# Patient Record
Sex: FEMALE | Race: BLACK OR AFRICAN AMERICAN | NOT HISPANIC OR LATINO | URBAN - METROPOLITAN AREA
[De-identification: names, ages, dates, MRNs, and addresses within clinical notes are randomized per-mention and may not be internally consistent; named-entity substitution may affect disease eponyms.]

---
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
## 2023-03-31 NOTE — ED PEDIATRIC NURSE NOTE - NS CRAFFT PART A1 ALCOHOL
Medical Record reviewed.    Praveen Alva was discharged from [facility] on 3/16/23.    30 day end date: 4/15/23    Spoke to Praveen Alva in follow-up to hospital stay.    Since discharge patient has been feeling good.     Ambulation/Activity:without difficulty     Appetite: Normal     Pain: no pain     Appointments: Reviewed her appointments     Patient Education: None     Patient did not have further questions or concerns.    Pt states still has the O2 but is not using at this time.  She stated she checks her Sats and they are consistently in the 90s    Next patient outreach encounter scheduled.  Please call if you have any questions.    Lyle SCHAFERN, RN  Transitional Care RN  [phone]  
No

## 2023-06-30 ENCOUNTER — EMERGENCY (EMERGENCY)
Facility: HOSPITAL | Age: 18
LOS: 0 days | Discharge: HOME | End: 2023-06-30
Attending: EMERGENCY MEDICINE
Payer: COMMERCIAL

## 2023-06-30 VITALS
DIASTOLIC BLOOD PRESSURE: 66 MMHG | HEART RATE: 81 BPM | TEMPERATURE: 98 F | SYSTOLIC BLOOD PRESSURE: 113 MMHG | RESPIRATION RATE: 17 BRPM | OXYGEN SATURATION: 100 %

## 2023-06-30 VITALS
HEIGHT: 63 IN | DIASTOLIC BLOOD PRESSURE: 78 MMHG | TEMPERATURE: 98 F | HEART RATE: 100 BPM | RESPIRATION RATE: 18 BRPM | WEIGHT: 229.94 LBS | OXYGEN SATURATION: 100 % | SYSTOLIC BLOOD PRESSURE: 118 MMHG

## 2023-06-30 DIAGNOSIS — S22.039A UNSPECIFIED FRACTURE OF THIRD THORACIC VERTEBRA, INITIAL ENCOUNTER FOR CLOSED FRACTURE: ICD-10-CM
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
## 2023-06-30 DIAGNOSIS — M54.89 OTHER DORSALGIA: ICD-10-CM

## 2023-06-30 DIAGNOSIS — R07.89 OTHER CHEST PAIN: ICD-10-CM
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
## 2023-06-30 DIAGNOSIS — S22.049A UNSPECIFIED FRACTURE OF FOURTH THORACIC VERTEBRA, INITIAL ENCOUNTER FOR CLOSED FRACTURE: ICD-10-CM
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
## 2023-06-30 LAB
ABO RH CONFIRMATION: SIGNIFICANT CHANGE UP
ANION GAP SERPL CALC-SCNC: 2 MMOL/L — LOW (ref 5–17)
BASOPHILS # BLD AUTO: 0.02 K/UL — SIGNIFICANT CHANGE UP (ref 0–0.2)
BASOPHILS NFR BLD AUTO: 0.2 % — SIGNIFICANT CHANGE UP (ref 0–2)
BLD GP AB SCN SERPL QL: SIGNIFICANT CHANGE UP
BUN SERPL-MCNC: 10 MG/DL — SIGNIFICANT CHANGE UP (ref 7–23)
CALCIUM SERPL-MCNC: 8.8 MG/DL — SIGNIFICANT CHANGE UP (ref 8.5–10.1)
CHLORIDE SERPL-SCNC: 107 MMOL/L — SIGNIFICANT CHANGE UP (ref 96–108)
CO2 SERPL-SCNC: 28 MMOL/L — SIGNIFICANT CHANGE UP (ref 22–31)
CREAT SERPL-MCNC: 0.76 MG/DL — SIGNIFICANT CHANGE UP (ref 0.5–1.3)
EGFR: 115 ML/MIN/1.73M2 — SIGNIFICANT CHANGE UP
EOSINOPHIL # BLD AUTO: 0.1 K/UL — SIGNIFICANT CHANGE UP (ref 0–0.5)
EOSINOPHIL NFR BLD AUTO: 1 % — SIGNIFICANT CHANGE UP (ref 0–6)
GLUCOSE SERPL-MCNC: 119 MG/DL — HIGH (ref 70–99)
HCT VFR BLD CALC: 36 % — SIGNIFICANT CHANGE UP (ref 34.5–45)
HGB BLD-MCNC: 11.7 G/DL — SIGNIFICANT CHANGE UP (ref 11.5–15.5)
IMM GRANULOCYTES NFR BLD AUTO: 0.7 % — SIGNIFICANT CHANGE UP (ref 0–0.9)
LYMPHOCYTES # BLD AUTO: 1.52 K/UL — SIGNIFICANT CHANGE UP (ref 1–3.3)
LYMPHOCYTES # BLD AUTO: 15.4 % — SIGNIFICANT CHANGE UP (ref 13–44)
MCHC RBC-ENTMCNC: 26.6 PG — LOW (ref 27–34)
MCHC RBC-ENTMCNC: 32.5 GM/DL — SIGNIFICANT CHANGE UP (ref 32–36)
MCV RBC AUTO: 81.8 FL — SIGNIFICANT CHANGE UP (ref 80–100)
MONOCYTES # BLD AUTO: 0.55 K/UL — SIGNIFICANT CHANGE UP (ref 0–0.9)
MONOCYTES NFR BLD AUTO: 5.6 % — SIGNIFICANT CHANGE UP (ref 2–14)
NEUTROPHILS # BLD AUTO: 7.62 K/UL — HIGH (ref 1.8–7.4)
NEUTROPHILS NFR BLD AUTO: 77.1 % — HIGH (ref 43–77)
PLATELET # BLD AUTO: 332 K/UL — SIGNIFICANT CHANGE UP (ref 150–400)
POTASSIUM SERPL-MCNC: 4 MMOL/L — SIGNIFICANT CHANGE UP (ref 3.5–5.3)
POTASSIUM SERPL-SCNC: 4 MMOL/L — SIGNIFICANT CHANGE UP (ref 3.5–5.3)
RBC # BLD: 4.4 M/UL — SIGNIFICANT CHANGE UP (ref 3.8–5.2)
RBC # FLD: 15.5 % — HIGH (ref 10.3–14.5)
SODIUM SERPL-SCNC: 137 MMOL/L — SIGNIFICANT CHANGE UP (ref 135–145)
WBC # BLD: 9.88 K/UL — SIGNIFICANT CHANGE UP (ref 3.8–10.5)
WBC # FLD AUTO: 9.88 K/UL — SIGNIFICANT CHANGE UP (ref 3.8–10.5)

## 2023-06-30 PROCEDURE — 71250 CT THORAX DX C-: CPT | Mod: MA

## 2023-06-30 PROCEDURE — 93010 ELECTROCARDIOGRAM REPORT: CPT

## 2023-06-30 PROCEDURE — 36415 COLL VENOUS BLD VENIPUNCTURE: CPT

## 2023-06-30 PROCEDURE — 86850 RBC ANTIBODY SCREEN: CPT

## 2023-06-30 PROCEDURE — 72146 MRI CHEST SPINE W/O DYE: CPT | Mod: 26,MA

## 2023-06-30 PROCEDURE — 70450 CT HEAD/BRAIN W/O DYE: CPT | Mod: 26,MA

## 2023-06-30 PROCEDURE — 72070 X-RAY EXAM THORAC SPINE 2VWS: CPT | Mod: 26

## 2023-06-30 PROCEDURE — 72146 MRI CHEST SPINE W/O DYE: CPT | Mod: MA

## 2023-06-30 PROCEDURE — 72070 X-RAY EXAM THORAC SPINE 2VWS: CPT

## 2023-06-30 PROCEDURE — 85025 COMPLETE CBC W/AUTO DIFF WBC: CPT

## 2023-06-30 PROCEDURE — 72125 CT NECK SPINE W/O DYE: CPT | Mod: MA

## 2023-06-30 PROCEDURE — 71250 CT THORAX DX C-: CPT | Mod: 26,MA

## 2023-06-30 PROCEDURE — 70450 CT HEAD/BRAIN W/O DYE: CPT | Mod: MA

## 2023-06-30 PROCEDURE — 72141 MRI NECK SPINE W/O DYE: CPT | Mod: MA

## 2023-06-30 PROCEDURE — 72125 CT NECK SPINE W/O DYE: CPT | Mod: 26,MA

## 2023-06-30 PROCEDURE — 72040 X-RAY EXAM NECK SPINE 2-3 VW: CPT

## 2023-06-30 PROCEDURE — 96374 THER/PROPH/DIAG INJ IV PUSH: CPT

## 2023-06-30 PROCEDURE — 99285 EMERGENCY DEPT VISIT HI MDM: CPT

## 2023-06-30 PROCEDURE — 99285 EMERGENCY DEPT VISIT HI MDM: CPT | Mod: 25

## 2023-06-30 PROCEDURE — 86900 BLOOD TYPING SEROLOGIC ABO: CPT

## 2023-06-30 PROCEDURE — 93005 ELECTROCARDIOGRAM TRACING: CPT

## 2023-06-30 PROCEDURE — 80048 BASIC METABOLIC PNL TOTAL CA: CPT

## 2023-06-30 PROCEDURE — 72040 X-RAY EXAM NECK SPINE 2-3 VW: CPT | Mod: 26

## 2023-06-30 PROCEDURE — 96375 TX/PRO/DX INJ NEW DRUG ADDON: CPT

## 2023-06-30 PROCEDURE — 86901 BLOOD TYPING SEROLOGIC RH(D): CPT

## 2023-06-30 PROCEDURE — 99053 MED SERV 10PM-8AM 24 HR FAC: CPT

## 2023-06-30 PROCEDURE — 72141 MRI NECK SPINE W/O DYE: CPT | Mod: 26,MA

## 2023-06-30 RX ORDER — CYCLOBENZAPRINE HYDROCHLORIDE 10 MG/1
10 TABLET, FILM COATED ORAL ONCE
Refills: 0 | Status: COMPLETED | OUTPATIENT
Start: 2023-06-30 | End: 2023-06-30

## 2023-06-30 RX ORDER — TRAMADOL HYDROCHLORIDE 50 MG/1
1 TABLET ORAL
Qty: 15 | Refills: 0
Start: 2023-06-30

## 2023-06-30 RX ORDER — ONDANSETRON 8 MG/1
4 TABLET, FILM COATED ORAL ONCE
Refills: 0 | Status: COMPLETED | OUTPATIENT
Start: 2023-06-30 | End: 2023-06-30

## 2023-06-30 RX ORDER — CYCLOBENZAPRINE HYDROCHLORIDE 10 MG/1
1 TABLET, FILM COATED ORAL
Qty: 15 | Refills: 0
Start: 2023-06-30

## 2023-06-30 RX ORDER — MORPHINE SULFATE 50 MG/1
4 CAPSULE, EXTENDED RELEASE ORAL ONCE
Refills: 0 | Status: DISCONTINUED | OUTPATIENT
Start: 2023-06-30 | End: 2023-06-30

## 2023-06-30 RX ORDER — ACETAMINOPHEN 500 MG
1000 TABLET ORAL ONCE
Refills: 0 | Status: COMPLETED | OUTPATIENT
Start: 2023-06-30 | End: 2023-06-30

## 2023-06-30 RX ADMIN — ONDANSETRON 4 MILLIGRAM(S): 8 TABLET, FILM COATED ORAL at 05:04

## 2023-06-30 RX ADMIN — CYCLOBENZAPRINE HYDROCHLORIDE 10 MILLIGRAM(S): 10 TABLET, FILM COATED ORAL at 03:26

## 2023-06-30 RX ADMIN — MORPHINE SULFATE 4 MILLIGRAM(S): 50 CAPSULE, EXTENDED RELEASE ORAL at 05:05

## 2023-06-30 RX ADMIN — Medication 1000 MILLIGRAM(S): at 03:26

## 2023-06-30 NOTE — ED ADULT NURSE NOTE - NSFALLRISKINTERV_ED_ALL_ED

## 2023-06-30 NOTE — ED PROVIDER NOTE - NSFOLLOWUPINSTRUCTIONS_ED_ALL_ED_FT
Please call and follow up with Dr. avila in 1 week.    Use Tylenol (acetaminophen) 1000mg every 6 hours and Motrin (Ibuprofen) 600 mg every 6 hours as need for fever/pain.    Return to the Emergency Department for worsening or persistent symptoms, and/or ANY NEW OR CONCERNING SYMPTOMS. If you have issues obtaining follow up, please call: [phone] or [phone]  to obtain a doctor or specialist who takes your insurance in your area.    Thoracolumbar Fracture    WHAT YOU NEED TO KNOW:    A thoracolumbar fracture is a break in a thoracic or lumbar vertebrae. The thoracic vertebrae are the 12 bones between your neck and lower back. They are connected to your ribs and help the ribs move when you breathe. The lumbar vertebrae are the 5 bones between your chest and hips. When a vertebra is damaged, the spinal cord may also be damaged.  Vertebral Column    DISCHARGE INSTRUCTIONS:    Call your local emergency number (911 in the ) if:    You feel lightheaded, short of breath, or have chest pain.    You cough up blood.    You have trouble moving your legs.    Your legs feel numb or you cannot move them.  Seek care immediately if:    Your arm or leg feels warm, tender, and painful. It may look swollen and red.    Call your doctor or orthopedist if:    You have pain or swelling on your back that is worse or does not go away.    You have questions or concerns about your condition or care.  Medicines: You may need any of the following:    NSAIDs, such as ibuprofen, help decrease swelling, pain, and fever. This medicine is available with or without a doctor's order. NSAIDs can cause stomach bleeding or kidney problems in certain people. If you take blood thinner medicine, always ask if NSAIDs are safe for you. Always read the medicine label and follow directions. Do not give these medicines to children younger than 6 months without direction from a healthcare provider.    Acetaminophen decreases pain and fever. It is available without a doctor's order. Ask how much to take and how often to take it. Follow directions. Read the labels of all other medicines you are using to see if they also contain acetaminophen, or ask your doctor or pharmacist. Acetaminophen can cause liver damage if not taken correctly.    Take your medicine as directed. Contact your healthcare provider if you think your medicine is not helping or if you have side effects. Tell your provider if you are allergic to any medicine. Keep a list of the medicines, vitamins, and herbs you take. Include the amounts, and when and why you take them. Bring the list or the pill bottles to follow-up visits. Carry your medicine list with you in case of an emergency.  Activity:    Avoid activities that may make the pain worse, such as picking up heavy objects. When your pain decreases, begin normal, slow movements as directed by your healthcare provider.    Ask your healthcare provider when you can begin to exercise. Together you can plan a safe exercise program that can help strengthen your back.    You may sleep better by doing the following:  Sleep on a firm mattress. You may also put a ½ to 1-inch piece of plywood between the mattress and box springs.    Do not use a waterbed, because it will not support your back correctly.    Sleep on your back with a pillow under your knees to decrease tension on your back. You may also sleep on your side with one or both of your knees bent.  Prevent more injury to your back:    Lift objects correctly, and use good posture to decrease your risk of injuring your back again. When you pick objects up, bend at the hips and knees. Never bend from the waist only. While you lift the object, keep it close to your chest. Try not to twist or lift anything above your waist.    Maintain a healthy weight. Being overweight puts more stress on your back.    Wear low-heeled shoes.  Physical and occupational therapy: Your healthcare provider may recommend you start or continue one or both types of therapy. A physical therapist teaches you exercises to help improve movement and strength, and to decrease pain. An occupational therapist teaches you new ways to do daily activities after an injury.    Follow up with your doctor or orthopedist as directed: Write down your questions so you remember to ask them during your visits.
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
## 2023-06-30 NOTE — ED PROVIDER NOTE - PATIENT PORTAL LINK FT
You can access the FollowMyHealth Patient Portal offered by [facility] by registering at the following website: [email or web address]. By joining [employer]’s FollowMyHealth portal, you will also be able to view your health information using other applications (apps) compatible with our system.
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
## 2023-06-30 NOTE — ED ADULT NURSE REASSESSMENT NOTE - NS ED NURSE REASSESS COMMENT FT1
patient sitting in bed comfortably. sleeping, arousable to voice. alert and oriented x 4 on arousal, perrl, respirations even and unlabored, ms strength 5/5 upper and lower ext bilaterally. family member at bedside. plan for MRI in am.

## 2024-04-15 ENCOUNTER — EMERGENCY (EMERGENCY)
Facility: HOSPITAL | Age: 19
LOS: 1 days | Discharge: HOME | End: 2024-04-15
Attending: EMERGENCY MEDICINE
Payer: COMMERCIAL

## 2024-04-15 VITALS
RESPIRATION RATE: 18 BRPM | SYSTOLIC BLOOD PRESSURE: 115 MMHG | HEIGHT: 63 IN | WEIGHT: 210.1 LBS | HEART RATE: 111 BPM | TEMPERATURE: 98 F | OXYGEN SATURATION: 98 % | DIASTOLIC BLOOD PRESSURE: 81 MMHG

## 2024-04-15 DIAGNOSIS — S00.33XA CONTUSION OF NOSE, INITIAL ENCOUNTER: ICD-10-CM

## 2024-04-15 DIAGNOSIS — R09.81 NASAL CONGESTION: ICD-10-CM
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
## 2024-04-15 DIAGNOSIS — M25.552 PAIN IN LEFT HIP: ICD-10-CM

## 2024-04-15 DIAGNOSIS — M54.6 PAIN IN THORACIC SPINE: ICD-10-CM

## 2024-04-15 DIAGNOSIS — Z87.81 PERSONAL HISTORY OF (HEALED) TRAUMATIC FRACTURE: ICD-10-CM

## 2024-04-15 DIAGNOSIS — M79.651 PAIN IN RIGHT THIGH: ICD-10-CM
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
## 2024-04-15 DIAGNOSIS — M79.604 PAIN IN RIGHT LEG: ICD-10-CM

## 2024-04-15 DIAGNOSIS — M54.50 LOW BACK PAIN, UNSPECIFIED: ICD-10-CM

## 2024-04-15 DIAGNOSIS — M25.551 PAIN IN RIGHT HIP: ICD-10-CM

## 2024-04-15 DIAGNOSIS — M25.561 PAIN IN RIGHT KNEE: ICD-10-CM

## 2024-04-15 LAB
ALBUMIN SERPL ELPH-MCNC: 3.7 G/DL — SIGNIFICANT CHANGE UP (ref 3.3–5.2)
ALP SERPL-CCNC: 93 U/L — SIGNIFICANT CHANGE UP (ref 40–120)
ALT FLD-CCNC: 11 U/L — SIGNIFICANT CHANGE UP
ANION GAP SERPL CALC-SCNC: 11 MMOL/L — SIGNIFICANT CHANGE UP (ref 5–17)
APTT BLD: 31.9 SEC — SIGNIFICANT CHANGE UP (ref 24.5–35.6)
AST SERPL-CCNC: 19 U/L — SIGNIFICANT CHANGE UP
BASOPHILS # BLD AUTO: 0.02 K/UL — SIGNIFICANT CHANGE UP (ref 0–0.2)
BASOPHILS NFR BLD AUTO: 0.2 % — SIGNIFICANT CHANGE UP (ref 0–2)
BILIRUB SERPL-MCNC: 0.5 MG/DL — SIGNIFICANT CHANGE UP (ref 0.4–2)
BLD GP AB SCN SERPL QL: SIGNIFICANT CHANGE UP
BUN SERPL-MCNC: 13.6 MG/DL — SIGNIFICANT CHANGE UP (ref 8–20)
CALCIUM SERPL-MCNC: 8.8 MG/DL — SIGNIFICANT CHANGE UP (ref 8.4–10.5)
CHLORIDE SERPL-SCNC: 103 MMOL/L — SIGNIFICANT CHANGE UP (ref 96–108)
CO2 SERPL-SCNC: 23 MMOL/L — SIGNIFICANT CHANGE UP (ref 22–29)
CREAT SERPL-MCNC: 0.63 MG/DL — SIGNIFICANT CHANGE UP (ref 0.5–1.3)
EGFR: 129 ML/MIN/1.73M2 — SIGNIFICANT CHANGE UP
EOSINOPHIL # BLD AUTO: 0.65 K/UL — HIGH (ref 0–0.5)
EOSINOPHIL NFR BLD AUTO: 7.4 % — HIGH (ref 0–6)
GLUCOSE SERPL-MCNC: 91 MG/DL — SIGNIFICANT CHANGE UP (ref 70–99)
HCT VFR BLD CALC: 38.6 % — SIGNIFICANT CHANGE UP (ref 34.5–45)
HGB BLD-MCNC: 12.8 G/DL — SIGNIFICANT CHANGE UP (ref 11.5–15.5)
IMM GRANULOCYTES NFR BLD AUTO: 0.2 % — SIGNIFICANT CHANGE UP (ref 0–0.9)
INR BLD: 1 RATIO — SIGNIFICANT CHANGE UP (ref 0.85–1.18)
LYMPHOCYTES # BLD AUTO: 1.65 K/UL — SIGNIFICANT CHANGE UP (ref 1–3.3)
LYMPHOCYTES # BLD AUTO: 18.8 % — SIGNIFICANT CHANGE UP (ref 13–44)
MCHC RBC-ENTMCNC: 27.1 PG — SIGNIFICANT CHANGE UP (ref 27–34)
MCHC RBC-ENTMCNC: 33.2 GM/DL — SIGNIFICANT CHANGE UP (ref 32–36)
MCV RBC AUTO: 81.8 FL — SIGNIFICANT CHANGE UP (ref 80–100)
MONOCYTES # BLD AUTO: 0.54 K/UL — SIGNIFICANT CHANGE UP (ref 0–0.9)
MONOCYTES NFR BLD AUTO: 6.2 % — SIGNIFICANT CHANGE UP (ref 2–14)
NEUTROPHILS # BLD AUTO: 5.89 K/UL — SIGNIFICANT CHANGE UP (ref 1.8–7.4)
NEUTROPHILS NFR BLD AUTO: 67.2 % — SIGNIFICANT CHANGE UP (ref 43–77)
PLATELET # BLD AUTO: 333 K/UL — SIGNIFICANT CHANGE UP (ref 150–400)
POTASSIUM SERPL-MCNC: 4.4 MMOL/L — SIGNIFICANT CHANGE UP (ref 3.5–5.3)
POTASSIUM SERPL-SCNC: 4.4 MMOL/L — SIGNIFICANT CHANGE UP (ref 3.5–5.3)
PROT SERPL-MCNC: 7.7 G/DL — SIGNIFICANT CHANGE UP (ref 6.6–8.7)
PROTHROM AB SERPL-ACNC: 11.1 SEC — SIGNIFICANT CHANGE UP (ref 9.5–13)
RBC # BLD: 4.72 M/UL — SIGNIFICANT CHANGE UP (ref 3.8–5.2)
RBC # FLD: 13.7 % — SIGNIFICANT CHANGE UP (ref 10.3–14.5)
SODIUM SERPL-SCNC: 137 MMOL/L — SIGNIFICANT CHANGE UP (ref 135–145)
WBC # BLD: 8.77 K/UL — SIGNIFICANT CHANGE UP (ref 3.8–10.5)
WBC # FLD AUTO: 8.77 K/UL — SIGNIFICANT CHANGE UP (ref 3.8–10.5)

## 2024-04-15 PROCEDURE — 71260 CT THORAX DX C+: CPT | Mod: 26,MC

## 2024-04-15 PROCEDURE — 85610 PROTHROMBIN TIME: CPT

## 2024-04-15 PROCEDURE — 74177 CT ABD & PELVIS W/CONTRAST: CPT | Mod: MC

## 2024-04-15 PROCEDURE — 96375 TX/PRO/DX INJ NEW DRUG ADDON: CPT | Mod: XU

## 2024-04-15 PROCEDURE — 86900 BLOOD TYPING SEROLOGIC ABO: CPT

## 2024-04-15 PROCEDURE — 74177 CT ABD & PELVIS W/CONTRAST: CPT | Mod: 26,MC

## 2024-04-15 PROCEDURE — 82962 GLUCOSE BLOOD TEST: CPT

## 2024-04-15 PROCEDURE — 86901 BLOOD TYPING SEROLOGIC RH(D): CPT

## 2024-04-15 PROCEDURE — 73562 X-RAY EXAM OF KNEE 3: CPT | Mod: 26,50

## 2024-04-15 PROCEDURE — 72125 CT NECK SPINE W/O DYE: CPT | Mod: 26,MC

## 2024-04-15 PROCEDURE — 71260 CT THORAX DX C+: CPT | Mod: MC

## 2024-04-15 PROCEDURE — 85025 COMPLETE CBC W/AUTO DIFF WBC: CPT

## 2024-04-15 PROCEDURE — 96374 THER/PROPH/DIAG INJ IV PUSH: CPT | Mod: XU

## 2024-04-15 PROCEDURE — 73523 X-RAY EXAM HIPS BI 5/> VIEWS: CPT

## 2024-04-15 PROCEDURE — 71045 X-RAY EXAM CHEST 1 VIEW: CPT

## 2024-04-15 PROCEDURE — 73552 X-RAY EXAM OF FEMUR 2/>: CPT | Mod: 26,50

## 2024-04-15 PROCEDURE — 73552 X-RAY EXAM OF FEMUR 2/>: CPT

## 2024-04-15 PROCEDURE — 85730 THROMBOPLASTIN TIME PARTIAL: CPT

## 2024-04-15 PROCEDURE — 72131 CT LUMBAR SPINE W/O DYE: CPT | Mod: 26,MC

## 2024-04-15 PROCEDURE — 72125 CT NECK SPINE W/O DYE: CPT | Mod: MC

## 2024-04-15 PROCEDURE — 73523 X-RAY EXAM HIPS BI 5/> VIEWS: CPT | Mod: 26

## 2024-04-15 PROCEDURE — 99284 EMERGENCY DEPT VISIT MOD MDM: CPT | Mod: 25

## 2024-04-15 PROCEDURE — 70450 CT HEAD/BRAIN W/O DYE: CPT | Mod: 26,MC

## 2024-04-15 PROCEDURE — 80053 COMPREHEN METABOLIC PANEL: CPT

## 2024-04-15 PROCEDURE — 73562 X-RAY EXAM OF KNEE 3: CPT

## 2024-04-15 PROCEDURE — 71045 X-RAY EXAM CHEST 1 VIEW: CPT | Mod: 26

## 2024-04-15 PROCEDURE — 70450 CT HEAD/BRAIN W/O DYE: CPT | Mod: MC

## 2024-04-15 PROCEDURE — 36415 COLL VENOUS BLD VENIPUNCTURE: CPT

## 2024-04-15 PROCEDURE — 99285 EMERGENCY DEPT VISIT HI MDM: CPT

## 2024-04-15 PROCEDURE — 86850 RBC ANTIBODY SCREEN: CPT

## 2024-04-15 RX ORDER — ACETAMINOPHEN 500 MG
1000 TABLET ORAL ONCE
Refills: 0 | Status: COMPLETED | OUTPATIENT
Start: 2024-04-15 | End: 2024-04-15

## 2024-04-15 RX ORDER — METHOCARBAMOL 500 MG/1
1500 TABLET, FILM COATED ORAL ONCE
Refills: 0 | Status: COMPLETED | OUTPATIENT
Start: 2024-04-15 | End: 2024-04-15

## 2024-04-15 RX ORDER — METHOCARBAMOL 500 MG/1
2 TABLET, FILM COATED ORAL
Qty: 18 | Refills: 0
Start: 2024-04-15 | End: 2024-04-17

## 2024-04-15 RX ORDER — KETOROLAC TROMETHAMINE 30 MG/ML
15 SYRINGE (ML) INJECTION ONCE
Refills: 0 | Status: DISCONTINUED | OUTPATIENT
Start: 2024-04-15 | End: 2024-04-15

## 2024-04-15 RX ORDER — MORPHINE SULFATE 50 MG/1
4 CAPSULE, EXTENDED RELEASE ORAL ONCE
Refills: 0 | Status: DISCONTINUED | OUTPATIENT
Start: 2024-04-15 | End: 2024-04-15

## 2024-04-15 RX ADMIN — Medication 15 MILLIGRAM(S): at 14:54

## 2024-04-15 RX ADMIN — METHOCARBAMOL 1500 MILLIGRAM(S): 500 TABLET, FILM COATED ORAL at 14:54

## 2024-04-15 RX ADMIN — Medication 400 MILLIGRAM(S): at 12:30

## 2024-04-15 NOTE — ED PROVIDER NOTE - PATIENT PORTAL LINK FT
You can access the FollowMyHealth Patient Portal offered by [facility] by registering at the following website: [email or web address]. By joining [facility]’s FollowMyHealth portal, you will also be able to view your health information using other applications (apps) compatible with our system.

## 2024-04-15 NOTE — ED ADULT TRIAGE NOTE - NS ED TRIAGE AVPU SCALE
Detail Level: Detailed Alert-The patient is alert, awake and responds to voice. The patient is oriented to time, place, and person. The triage nurse is able to obtain subjective information. Tazorac Pregnancy And Lactation Text: This medication is not safe during pregnancy. It is unknown if this medication is excreted in breast milk. High Dose Vitamin A Pregnancy And Lactation Text: High dose vitamin A therapy is contraindicated during pregnancy and breast feeding. Benzoyl Peroxide Counseling: Patient counseled that medicine may cause skin irritation and bleach clothing.  In the event of skin irritation, the patient was advised to reduce the amount of the drug applied or use it less frequently.   The patient verbalized understanding of the proper use and possible adverse effects of benzoyl peroxide.  All of the patient's questions and concerns were addressed. Azithromycin Pregnancy And Lactation Text: This medication is considered safe during pregnancy and is also secreted in breast milk. Topical Sulfur Applications Pregnancy And Lactation Text: This medication is Pregnancy Category C and has an unknown safety profile during pregnancy. It is unknown if this topical medication is excreted in breast milk. Erythromycin Counseling:  I discussed with the patient the risks of erythromycin including but not limited to GI upset, allergic reaction, drug rash, diarrhea, increase in liver enzymes, and yeast infections. Sarecycline Pregnancy And Lactation Text: This medication is Pregnancy Category D and not consider safe during pregnancy. It is also excreted in breast milk. Birth Control Pills Pregnancy And Lactation Text: This medication should be avoided if pregnant and for the first 30 days post-partum. High Dose Vitamin A Counseling: Side effects reviewed, pt to contact office should one occur. Tazorac Counseling:  Patient advised that medication is irritating and drying.  Patient may need to apply sparingly and wash off after an hour before eventually leaving it on overnight.  The patient verbalized understanding of the proper use and possible adverse effects of tazorac.  All of the patient's questions and concerns were addressed. Spironolactone Pregnancy And Lactation Text: This medication can cause feminization of the male fetus and should be avoided during pregnancy. The active metabolite is also found in breast milk. Dapsone Pregnancy And Lactation Text: This medication is Pregnancy Category C and is not considered safe during pregnancy or breast feeding. Minocycline Counseling: Patient advised regarding possible photosensitivity and discoloration of the teeth, skin, lips, tongue and gums.  Patient instructed to avoid sunlight, if possible.  When exposed to sunlight, patients should wear protective clothing, sunglasses, and sunscreen.  The patient was instructed to call the office immediately if the following severe adverse effects occur:  hearing changes, easy bruising/bleeding, severe headache, or vision changes.  The patient verbalized understanding of the proper use and possible adverse effects of minocycline.  All of the patient's questions and concerns were addressed. Benzoyl Peroxide Pregnancy And Lactation Text: This medication is Pregnancy Category C. It is unknown if benzoyl peroxide is excreted in breast milk. Bactrim Counseling:  I discussed with the patient the risks of sulfa antibiotics including but not limited to GI upset, allergic reaction, drug rash, diarrhea, dizziness, photosensitivity, and yeast infections.  Rarely, more serious reactions can occur including but not limited to aplastic anemia, agranulocytosis, methemoglobinemia, blood dyscrasias, liver or kidney failure, lung infiltrates or desquamative/blistering drug rashes. Erythromycin Pregnancy And Lactation Text: This medication is Pregnancy Category B and is considered safe during pregnancy. It is also excreted in breast milk. Spironolactone Counseling: Patient advised regarding risks of diarrhea, abdominal pain, hyperkalemia, birth defects (for female patients), liver toxicity and renal toxicity. The patient may need blood work to monitor liver and kidney function and potassium levels while on therapy. The patient verbalized understanding of the proper use and possible adverse effects of spironolactone.  All of the patient's questions and concerns were addressed. Dapsone Counseling: I discussed with the patient the risks of dapsone including but not limited to hemolytic anemia, agranulocytosis, rashes, methemoglobinemia, kidney failure, peripheral neuropathy, headaches, GI upset, and liver toxicity.  Patients who start dapsone require monitoring including baseline LFTs and weekly CBCs for the first month, then every month thereafter.  The patient verbalized understanding of the proper use and possible adverse effects of dapsone.  All of the patient's questions and concerns were addressed. Topical Clindamycin Pregnancy And Lactation Text: This medication is Pregnancy Category B and is considered safe during pregnancy. It is unknown if it is excreted in breast milk. Use Enhanced Medication Counseling?: No Doxycycline Counseling:  Patient counseled regarding possible photosensitivity and increased risk for sunburn.  Patient instructed to avoid sunlight, if possible.  When exposed to sunlight, patients should wear protective clothing, sunglasses, and sunscreen.  The patient was instructed to call the office immediately if the following severe adverse effects occur:  hearing changes, easy bruising/bleeding, severe headache, or vision changes.  The patient verbalized understanding of the proper use and possible adverse effects of doxycycline.  All of the patient's questions and concerns were addressed. Topical Retinoid counseling:  Patient advised to apply a pea-sized amount only at bedtime and wait 30 minutes after washing their face before applying.  If too drying, patient may add a non-comedogenic moisturizer. The patient verbalized understanding of the proper use and possible adverse effects of retinoids.  All of the patient's questions and concerns were addressed. Bactrim Pregnancy And Lactation Text: This medication is Pregnancy Category D and is known to cause fetal risk.  It is also excreted in breast milk. Isotretinoin Counseling: Patient should get monthly blood tests, not donate blood, not drive at night if vision affected, not share medication, and not undergo elective surgery for 6 months after tx completed. Side effects reviewed, pt to contact office should one occur. Topical Clindamycin Counseling: Patient counseled that this medication may cause skin irritation or allergic reactions.  In the event of skin irritation, the patient was advised to reduce the amount of the drug applied or use it less frequently.   The patient verbalized understanding of the proper use and possible adverse effects of clindamycin.  All of the patient's questions and concerns were addressed. Topical Sulfur Applications Counseling: Topical Sulfur Counseling: Patient counseled that this medication may cause skin irritation or allergic reactions.  In the event of skin irritation, the patient was advised to reduce the amount of the drug applied or use it less frequently.   The patient verbalized understanding of the proper use and possible adverse effects of topical sulfur application.  All of the patient's questions and concerns were addressed. Azithromycin Counseling:  I discussed with the patient the risks of azithromycin including but not limited to GI upset, allergic reaction, drug rash, diarrhea, and yeast infections. Doxycycline Pregnancy And Lactation Text: This medication is Pregnancy Category D and not consider safe during pregnancy. It is also excreted in breast milk but is considered safe for shorter treatment courses. Sarecycline Counseling: Patient advised regarding possible photosensitivity and discoloration of the teeth, skin, lips, tongue and gums.  Patient instructed to avoid sunlight, if possible.  When exposed to sunlight, patients should wear protective clothing, sunglasses, and sunscreen.  The patient was instructed to call the office immediately if the following severe adverse effects occur:  hearing changes, easy bruising/bleeding, severe headache, or vision changes.  The patient verbalized understanding of the proper use and possible adverse effects of sarecycline.  All of the patient's questions and concerns were addressed. Birth Control Pills Counseling: Birth Control Pill Counseling: I discussed with the patient the potential side effects of OCPs including but not limited to increased risk of stroke, heart attack, thrombophlebitis, deep venous thrombosis, hepatic adenomas, breast changes, GI upset, headaches, and depression.  The patient verbalized understanding of the proper use and possible adverse effects of OCPs. All of the patient's questions and concerns were addressed. Isotretinoin Pregnancy And Lactation Text: This medication is Pregnancy Category X and is considered extremely dangerous during pregnancy. It is unknown if it is excreted in breast milk. Topical Retinoid Pregnancy And Lactation Text: This medication is Pregnancy Category C. It is unknown if this medication is excreted in breast milk. Tetracycline Counseling: Patient counseled regarding possible photosensitivity and increased risk for sunburn.  Patient instructed to avoid sunlight, if possible.  When exposed to sunlight, patients should wear protective clothing, sunglasses, and sunscreen.  The patient was instructed to call the office immediately if the following severe adverse effects occur:  hearing changes, easy bruising/bleeding, severe headache, or vision changes.  The patient verbalized understanding of the proper use and possible adverse effects of tetracycline.  All of the patient's questions and concerns were addressed. Patient understands to avoid pregnancy while on therapy due to potential birth defects.
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
## 2024-04-15 NOTE — ED PROVIDER NOTE - ADDITIONAL NOTES AND INSTRUCTIONS:
To whom it may concern,     This  patient was evaluated at [facility], and was deemed to require time off from work/school. They may return on the mentioned date below. If you have any questions or concerns, you may contact the emergency department at [facility].  Thank you.     Dr. William Wiedemann, DO

## 2024-04-15 NOTE — ED PROVIDER NOTE - PHYSICAL EXAMINATION
Const:  Awake, alert and oriented to person, place, & time. In mild painful distress  HEENT:  head and scalp are atraumatic.  Dry area blood in the left naris with tenderness and ecchymosis over the nasal bridge.  No nasal septal hematoma.  No intraoral lacerations.  Mandible stable.  Eyes: PERRLA. No scleral icterus. EOMI.  Neck:. Soft and supple. Full ROM without pain. No cervical midline tenderness.   Cardiac: Regular rate and regular rhythm. +S1/S2. Peripheral pulses 2+ and symmetric. No LE edema.  Resp: Speaking in full sentences, breath sounds equal and clear bilaterally. No wheezes, rales or rhonchi.  Abd: Soft, non-tender, non-distended. Normal bowel sounds in all 4 quadrants. No guarding or rebound.  MSK:   Tenderness over the left lateral hip, right anterior thigh, right knee.  Patient unable to flex right knee secondary to pain.  Tenderness over the mid thoracic and lumbar  Skin: No rashes  Neuro:   5 out of 5 strength, bilateral upper and lower extremities.  Cranial nerves II through XII intact.
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
## 2024-04-15 NOTE — ED ADULT TRIAGE NOTE - ESI TRIAGE ACUITY LEVEL, MLM
Patient Name: Cristina Lemus  : 3/13/1932    MRN: [id number]                              Today's Date: 2021       Admit Date: 2021    Visit Dx:     ICD-10-CM ICD-9-CM   1. KIM (acute kidney injury) (CMS/Formerly [facility])  N17.9 584.9   2. Dehydration  E86.0 276.51   3. Current use of long term anticoagulation  Z79.01 V58.61     Patient Active Problem List   Diagnosis   • AVNRT (AV joy re-entry tachycardia) (CMS/Formerly [facility])   • History of pulmonary embolus (PE)   • HTN, goal below 140/80   • Permanent atrial fibrillation (CMS/Formerly [facility])   • Stage 4 chronic kidney disease (CMS/Formerly [facility])   • Chronic diastolic (congestive) heart failure (CMS/Formerly [facility])   • Anxiety disorder   • Chronic gout without tophus   • KIM (acute kidney injury) (CMS/Formerly [facility])     Past Medical History:   Diagnosis Date   • Atrial fibrillation (CMS/Formerly [facility])    • CHF (congestive heart failure) (CMS/Formerly [facility])      Past Surgical History:   Procedure Laterality Date   • BACK SURGERY     • CYST REMOVAL     • OTHER SURGICAL HISTORY      rupture disk     General Information     Row Name 21          Physical Therapy Time and Intention    Document Type  evaluation  -CB     Mode of Treatment  individual therapy;physical therapy  -CB     Row Name 21          General Information    Patient Profile Reviewed  yes  -CB     Prior Level of Function  independent:;gait;transfer;bed mobility;ADL's rwx for the past few months per pt  -CB     Existing Precautions/Restrictions  fall  -CB     Barriers to Rehab  none identified  -CB     Row Name 21          Living Environment    Lives With  child(liana), adult  -CB     Row Name 21          Home Main Entrance    Number of Stairs, Main Entrance  none  -CB     Row Name 21          Stairs Within Home, Primary    Number of Stairs, Within Home, Primary  none  -CB     Row Name 21          Cognition    Orientation Status (Cognition)  oriented x 4  -CB     Row Name 21          Safety Issues,  Functional Mobility    Impairments Affecting Function (Mobility)  endurance/activity tolerance;strength;balance  -CB     Comment, Safety Issues/Impairments (Mobility)  gait belt and non skid socks  -CB       User Key  (r) = Recorded By, (t) = Taken By, (c) = Cosigned By    Initials Name Provider Type    CB Marija Shelby Physical Therapist        Mobility     Row Name 06/11/21 0920          Bed Mobility    Bed Mobility  supine-sit  -CB     Supine-Sit [place] (Bed Mobility)  standby assist;verbal cues  -CB     Assistive Device (Bed Mobility)  head of bed elevated  -CB     Row Name 06/11/21 0920          Bed-Chair Transfer    Bed-Chair [place] (Transfers)  contact guard;verbal cues  -CB     Assistive Device (Bed-Chair Transfers)  walker, front-wheeled  -CB     Row Name 06/11/21 0920          Sit-Stand Transfer    Sit-Stand [place] (Transfers)  contact guard  -CB     Assistive Device (Sit-Stand Transfers)  walker, front-wheeled  -CB     Row Name 06/11/21 0920          Gait/Stairs (Locomotion)    [place] Level (Gait)  contact guard;1 person assist  -CB     Assistive Device (Gait)  walker, front-wheeled  -CB     Distance in Feet (Gait)  175ft  -CB     Deviations/Abnormal Patterns (Gait)  jeremie decreased;stride length decreased  -CB     Comment (Gait/Stairs)  minimal unsteadiness although no LOB  -CB       User Key  (r) = Recorded By, (t) = Taken By, (c) = Cosigned By    Initials Name Provider Type    Marija Ansari Physical Therapist        Obj/Interventions     Row Name 06/11/21 0921          Range of Motion Comprehensive    General Range of Motion  bilateral lower extremity ROM WFL  -CB     Row Name 06/11/21 0921          Strength Comprehensive (MMT)    Comment, General Manual Muscle Testing (MMT) Assessment  generalized BLE weakness  -CB     Row Name 06/11/21 0921          Motor Skills    Therapeutic Exercise  -- seated DF/PF and SLR x10 reps  -CB     Row Name 06/11/21 0921          Balance     Balance Assessment  standing static balance;standing dynamic balance  -CB     Static Standing Balance  WFL;supported;standing  -CB     Dynamic Standing Balance  mild impairment;supported;standing  -CB     Balance Interventions  sitting;standing;sit to stand;supported;static;dynamic;minimal challenge  -CB     Row Name 06/11/21 0921          Sensory Assessment (Somatosensory)    Sensory Assessment (Somatosensory)  LE sensation intact  -CB       User Key  (r) = Recorded By, (t) = Taken By, (c) = Cosigned By    Initials Name Provider Type    CB Marija Shelby Physical Therapist        Goals/Plan     Row Name 06/11/21 0925          Bed Mobility Goal 1 (PT)    Activity/Assistive Device (Bed Mobility Goal 1, PT)  bed mobility activities, all  -CB     [place] Level/Cues Needed (Bed Mobility Goal 1, PT)  modified independence  -CB     Time Frame (Bed Mobility Goal 1, PT)  long term goal (LTG);1 week  -CB     Row Name 06/11/21 0925          Transfer Goal 1 (PT)    Activity/Assistive Device (Transfer Goal 1, PT)  transfers, all;walker, rolling  -CB     [place] Level/Cues Needed (Transfer Goal 1, PT)  supervision required  -CB     Time Frame (Transfer Goal 1, PT)  long term goal (LTG);1 week  -CB     Row Name 06/11/21 0925          Gait Training Goal 1 (PT)    Activity/Assistive Device (Gait Training Goal 1, PT)  gait (walking locomotion);walker, rolling  -CB     [place] Level (Gait Training Goal 1, PT)  supervision required  -CB     Time Frame (Gait Training Goal 1, PT)  long term goal (LTG);1 week  -CB       User Key  (r) = Recorded By, (t) = Taken By, (c) = Cosigned By    Initials Name Provider Type    Marija Ansari Physical Therapist        Clinical Impression     Row Name 06/11/21 0921          Pain    Additional Documentation  Pain Scale: Numbers Pre/Post-Treatment (Group)  -CB     Row Name 06/11/21 0921          Pain Scale: Numbers Pre/Post-Treatment    Pretreatment Pain Rating  0/10 - no pain  -CB      Posttreatment Pain Rating  0/10 - no pain  -CB     Row Name 06/11/21 0921          Plan of Care Review    Plan of Care Reviewed With  patient  -CB     Progress  improving  -CB     Outcome Summary  Patient is a 89 yo female who presented with weakness and increased urinary frequency and admitted with KIM and dehydration. The patient states she lives with her daughter with no steps to enter home. Pt uses rwx at home for the last few months and is independent with mobility and ADLs. The patient presents today with decreased balance, activity tolerance, and strength. The patient compelted supine to sitting EOB with SBA, STSx2 with CGA, and ambulated 175ft with rwx and CGA. No LOB or unsteadiness with ambulation this date. Slight unsteadiness in standing to perform lower body dressing at toilet. The patient will likely continue to benefit from skilled PT to increase level of independence. Anticipate home with daughter.  -CB     Row Name 06/11/21 0921          Therapy Assessment/Plan (PT)    Patient/Family Therapy Goals Statement (PT)  to go home with daughter  -CB     Rehab Potential (PT)  good, to achieve stated therapy goals  -CB     Criteria for Skilled Interventions Met (PT)  yes  -CB     Row Name 06/11/21 0921          Positioning and Restraints    Pre-Treatment Position  in bed  -CB     Post Treatment Position  chair  -CB     In Chair  notified nsg;reclined;call light within reach;encouraged to call for assist;exit alarm on  -CB       User Key  (r) = Recorded By, (t) = Taken By, (c) = Cosigned By    Initials Name Provider Type    CB Marija Shelby Physical Therapist        Outcome Measures     Row Name 06/11/21 0925          How much help from another person do you currently need...    Turning from your back to your side while in flat bed without using bedrails?  3  -CB     Moving from lying on back to sitting on the side of a flat bed without bedrails?  3  -CB     Moving to and from a bed to a chair (including a  wheelchair)?  3  -CB     Standing up from a chair using your arms (e.g., wheelchair, bedside chair)?  3  -CB     Climbing 3-5 steps with a railing?  2  -CB     To walk in hospital room?  3  -CB     AM-PAC 6 Clicks Score (PT)  17  -CB     Row Name 06/11/21 0925          Functional Assessment    Outcome Measure Options  AM-PAC 6 Clicks Basic Mobility (PT)  -CB       User Key  (r) = Recorded By, (t) = Taken By, (c) = Cosigned By    Initials Name Provider Type    CB Marija Shelby Physical Therapist        Physical Therapy Education                 Title: PT OT SLP Therapies (Done)     Topic: Physical Therapy (Done)     Point: Mobility training (Done)     Learning Progress Summary           Patient Acceptance, E,TB,D, VU,DU,NR by CB at 6/11/2021 0926                   Point: Home exercise program (Done)     Learning Progress Summary           Patient Acceptance, E,TB,D, VU,DU,NR by CB at 6/11/2021 0926                   Point: Body mechanics (Done)     Learning Progress Summary           Patient Acceptance, E,TB,D, VU,DU,NR by CB at 6/11/2021 0926                   Point: Precautions (Done)     Learning Progress Summary           Patient Acceptance, E,TB,D, VU,DU,NR by CB at 6/11/2021 0926                               User Key     Initials Effective Dates Name Provider Type Discipline     12/30/20 -  Marija Shelby Physical Therapist PT              PT Recommendation and Plan  Planned Therapy Interventions (PT): balance training, bed mobility training, gait training, home exercise program, patient/family education, postural re-education, transfer training, strengthening  Plan of Care Reviewed With: patient  Progress: improving  Outcome Summary: Patient is a 88 yo female who presented with weakness and increased urinary frequency and admitted with KIM and dehydration. The patient states she lives with her daughter with no steps to enter home. Pt uses rwx at home for the last few months and is independent with mobility  and ADLs. The patient presents today with decreased balance, activity tolerance, and strength. The patient compelted supine to sitting EOB with SBA, STSx2 with CGA, and ambulated 175ft with rwx and CGA. No LOB or unsteadiness with ambulation this date. Slight unsteadiness in standing to perform lower body dressing at toilet. The patient will likely continue to benefit from skilled PT to increase level of independence. Anticipate home with daughter.     Time Calculation:   PT Charges     Row Name 06/11/21 0927             Time Calculation    Start Time  0845  -CB      Stop Time  0905  -CB      Time Calculation (min)  20 min  -CB      PT Received On  06/11/21  -CB      PT - Next Appointment  06/14/21  -CB      PT Goal Re-Cert Due Date  06/18/21  -CB         Time Calculation- PT    Total Timed Code Minutes- PT  15 minute(s)  -CB         Timed Charges    10933 - PT Therapeutic Activity Minutes  15  -CB         Untimed Charges    PT Eval/Re-eval Minutes  10  -CB         Total Minutes    Timed Charges Total Minutes  15  -CB      Untimed Charges Total Minutes  10  -CB       Total Minutes  25  -CB        User Key  (r) = Recorded By, (t) = Taken By, (c) = Cosigned By    Initials Name Provider Type    CB Marija Shelby Physical Therapist        Therapy Charges for Today     Code Description Service Date Service Provider Modifiers Qty    03249770015 HC PT THERAPEUTIC ACT EA 15 MIN 6/11/2021 Marija Shelby GP 1    52686821025 HC PT EVAL MOD COMPLEXITY 2 6/11/2021 Marija Shelby GP 1          PT G-Codes  Outcome Measure Options: AM-PAC 6 Clicks Basic Mobility (PT)  AM-PAC 6 Clicks Score (PT): 17    Marija Shelby  6/11/2021     3

## 2024-04-17 ENCOUNTER — OUTPATIENT (OUTPATIENT)
Dept: OUTPATIENT SERVICES | Facility: HOSPITAL | Age: 19
LOS: 1 days | End: 2024-04-17
Payer: COMMERCIAL

## 2024-04-17 VITALS
DIASTOLIC BLOOD PRESSURE: 75 MMHG | RESPIRATION RATE: 16 BRPM | SYSTOLIC BLOOD PRESSURE: 132 MMHG | TEMPERATURE: 98 F | WEIGHT: 210.1 LBS | OXYGEN SATURATION: 97 % | HEIGHT: 63 IN

## 2024-04-17 DIAGNOSIS — Z01.818 ENCOUNTER FOR OTHER PREPROCEDURAL EXAMINATION: ICD-10-CM

## 2024-04-17 DIAGNOSIS — M54.16 RADICULOPATHY, LUMBAR REGION: ICD-10-CM

## 2024-04-17 LAB
A1C WITH ESTIMATED AVERAGE GLUCOSE RESULT: 5.4 % — SIGNIFICANT CHANGE UP (ref 4–5.6)
ANION GAP SERPL CALC-SCNC: 8 MMOL/L — SIGNIFICANT CHANGE UP (ref 5–17)
BUN SERPL-MCNC: 12 MG/DL — SIGNIFICANT CHANGE UP (ref 7–23)
CALCIUM SERPL-MCNC: 8.9 MG/DL — SIGNIFICANT CHANGE UP (ref 8.5–10.1)
CHLORIDE SERPL-SCNC: 110 MMOL/L — HIGH (ref 96–108)
CO2 SERPL-SCNC: 24 MMOL/L — SIGNIFICANT CHANGE UP (ref 22–31)
CREAT SERPL-MCNC: 0.69 MG/DL — SIGNIFICANT CHANGE UP (ref 0.5–1.3)
EGFR: 127 ML/MIN/1.73M2 — SIGNIFICANT CHANGE UP
ESTIMATED AVERAGE GLUCOSE: 108 MG/DL — SIGNIFICANT CHANGE UP (ref 68–114)
GLUCOSE SERPL-MCNC: 93 MG/DL — SIGNIFICANT CHANGE UP (ref 70–99)
HCG SERPL-ACNC: <1 MIU/ML — SIGNIFICANT CHANGE UP
HCT VFR BLD CALC: 36.5 % — SIGNIFICANT CHANGE UP (ref 34.5–45)
HGB BLD-MCNC: 11.7 G/DL — SIGNIFICANT CHANGE UP (ref 11.5–15.5)
MCHC RBC-ENTMCNC: 26.3 PG — LOW (ref 27–34)
MCHC RBC-ENTMCNC: 32.1 GM/DL — SIGNIFICANT CHANGE UP (ref 32–36)
MCV RBC AUTO: 82 FL — SIGNIFICANT CHANGE UP (ref 80–100)
NRBC # BLD: 0 /100 WBCS — SIGNIFICANT CHANGE UP (ref 0–0)
PLATELET # BLD AUTO: 320 K/UL — SIGNIFICANT CHANGE UP (ref 150–400)
POTASSIUM SERPL-MCNC: 4.3 MMOL/L — SIGNIFICANT CHANGE UP (ref 3.5–5.3)
POTASSIUM SERPL-SCNC: 4.3 MMOL/L — SIGNIFICANT CHANGE UP (ref 3.5–5.3)
RBC # BLD: 4.45 M/UL — SIGNIFICANT CHANGE UP (ref 3.8–5.2)
RBC # FLD: 13.9 % — SIGNIFICANT CHANGE UP (ref 10.3–14.5)
SODIUM SERPL-SCNC: 142 MMOL/L — SIGNIFICANT CHANGE UP (ref 135–145)
WBC # BLD: 5.72 K/UL — SIGNIFICANT CHANGE UP (ref 3.8–10.5)
WBC # FLD AUTO: 5.72 K/UL — SIGNIFICANT CHANGE UP (ref 3.8–10.5)

## 2024-04-17 PROCEDURE — 83036 HEMOGLOBIN GLYCOSYLATED A1C: CPT

## 2024-04-17 PROCEDURE — 87640 STAPH A DNA AMP PROBE: CPT

## 2024-04-17 PROCEDURE — 87641 MR-STAPH DNA AMP PROBE: CPT

## 2024-04-17 PROCEDURE — 86850 RBC ANTIBODY SCREEN: CPT

## 2024-04-17 PROCEDURE — 86901 BLOOD TYPING SEROLOGIC RH(D): CPT

## 2024-04-17 PROCEDURE — 86900 BLOOD TYPING SEROLOGIC ABO: CPT

## 2024-04-17 PROCEDURE — 80048 BASIC METABOLIC PNL TOTAL CA: CPT

## 2024-04-17 PROCEDURE — 84702 CHORIONIC GONADOTROPIN TEST: CPT

## 2024-04-17 PROCEDURE — 72110 X-RAY EXAM L-2 SPINE 4/>VWS: CPT

## 2024-04-17 PROCEDURE — 72110 X-RAY EXAM L-2 SPINE 4/>VWS: CPT | Mod: 26

## 2024-04-17 PROCEDURE — G0463: CPT

## 2024-04-17 PROCEDURE — 85027 COMPLETE CBC AUTOMATED: CPT

## 2024-04-17 PROCEDURE — 36415 COLL VENOUS BLD VENIPUNCTURE: CPT

## 2024-04-17 NOTE — H&P PST ADULT - NSICDXPROCEDURE_GEN_ALL_CORE_FT
PROCEDURES:  Laminectomy, spine, lumbar, with facetectomy and foraminotomy 17-Apr-2024 11:37:56 1 segment Lumbar; [address]240 Ibanez Facetectomy & Foraminotomy 1 sg Ea Crv Thor/ Lum Melba Mak

## 2024-04-17 NOTE — H&P PST ADULT - PROBLEM SELECTOR PLAN 2
Labs CBC, BMP, A1C, HCG, T&S, Nasal Swab, Lumbar XR, and EKG   Medical clearance necessary  Anesthesia consult with Dr. Samson  Pre op and Hibiclens instructions reviewed and given.   Take routine prn medication if needed.  Instructed to hold and/or avoid other NSAIDs and OTC supplements. Tylenol is ok. Verbalized understanding
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
## 2024-04-17 NOTE — H&P PST ADULT - MUSCULOSKELETAL
negative Antalgic gait, Decreased strength B/L LE d/t pain/normal/ROM intact/strength 5/5 bilateral upper extremities

## 2024-04-17 NOTE — H&P PST ADULT - NSICDXFAMILYHX_GEN_ALL_CORE_FT
FAMILY HISTORY:  Grandparent  Still living? Unknown  FH: cardiovascular disease, Age at diagnosis: Age Unknown    Uncle  Still living? Unknown  FH: sickle cell anemia, Age at diagnosis: Age Unknown

## 2024-04-18 LAB
MRSA PCR RESULT.: SIGNIFICANT CHANGE UP
S AUREUS DNA NOSE QL NAA+PROBE: SIGNIFICANT CHANGE UP

## 2024-05-16 ENCOUNTER — TRANSCRIPTION ENCOUNTER (OUTPATIENT)
Age: 19
End: 2024-05-16

## 2024-05-16 RX ORDER — SODIUM CHLORIDE 9 MG/ML
1000 INJECTION, SOLUTION INTRAVENOUS
Refills: 0 | Status: DISCONTINUED | OUTPATIENT
Start: 2024-05-17 | End: 2024-05-17

## 2024-05-16 NOTE — ASU PATIENT PROFILE, ADULT - PRO INTERPRETER NEED 2
[place] Referral completed, faxed to the office and patient provided the phone number to call and schedule an appointment.     English

## 2024-05-16 NOTE — ASU PATIENT PROFILE, ADULT - FALL HARM RISK - UNIVERSAL INTERVENTIONS
Bed in lowest position, wheels locked, appropriate side rails in place/Call bell, personal items and telephone in reach/Instruct patient to call for assistance before getting out of bed or chair/Non-slip footwear when patient is out of bed/[place] to call system/Physically safe environment - no spills, clutter or unnecessary equipment/Purposeful Proactive Rounding/Room/bathroom lighting operational, light cord in reach

## 2024-05-17 ENCOUNTER — INPATIENT (INPATIENT)
Facility: HOSPITAL | Age: 19
LOS: 2 days | Discharge: HOME | DRG: 519 | End: 2024-05-20
Attending: ORTHOPAEDIC SURGERY | Admitting: ORTHOPAEDIC SURGERY
Payer: COMMERCIAL

## 2024-05-17 ENCOUNTER — RESULT REVIEW (OUTPATIENT)
Age: 19
End: 2024-05-17

## 2024-05-17 VITALS
HEART RATE: 105 BPM | HEIGHT: 63 IN | WEIGHT: 210.1 LBS | DIASTOLIC BLOOD PRESSURE: 79 MMHG | TEMPERATURE: 98 F | RESPIRATION RATE: 18 BRPM | SYSTOLIC BLOOD PRESSURE: 137 MMHG | OXYGEN SATURATION: 98 %

## 2024-05-17 DIAGNOSIS — M54.16 RADICULOPATHY, LUMBAR REGION: ICD-10-CM

## 2024-05-17 LAB
ANION GAP SERPL CALC-SCNC: 5 MMOL/L — SIGNIFICANT CHANGE UP (ref 5–17)
BUN SERPL-MCNC: 9 MG/DL — SIGNIFICANT CHANGE UP (ref 7–23)
CALCIUM SERPL-MCNC: 8.8 MG/DL — SIGNIFICANT CHANGE UP (ref 8.5–10.1)
CHLORIDE SERPL-SCNC: 110 MMOL/L — HIGH (ref 96–108)
CO2 SERPL-SCNC: 21 MMOL/L — LOW (ref 22–31)
CREAT SERPL-MCNC: 0.79 MG/DL — SIGNIFICANT CHANGE UP (ref 0.5–1.3)
EGFR: 109 ML/MIN/1.73M2 — SIGNIFICANT CHANGE UP
GLUCOSE SERPL-MCNC: 99 MG/DL — SIGNIFICANT CHANGE UP (ref 70–99)
HCG UR QL: NEGATIVE — SIGNIFICANT CHANGE UP
HCT VFR BLD CALC: 34.8 % — SIGNIFICANT CHANGE UP (ref 34.5–45)
HGB BLD-MCNC: 11.3 G/DL — LOW (ref 11.5–15.5)
MCHC RBC-ENTMCNC: 26.7 PG — LOW (ref 27–34)
MCHC RBC-ENTMCNC: 32.5 GM/DL — SIGNIFICANT CHANGE UP (ref 32–36)
MCV RBC AUTO: 82.1 FL — SIGNIFICANT CHANGE UP (ref 80–100)
NRBC # BLD: 0 /100 WBCS — SIGNIFICANT CHANGE UP (ref 0–0)
PLATELET # BLD AUTO: 232 K/UL — SIGNIFICANT CHANGE UP (ref 150–400)
POTASSIUM SERPL-MCNC: 4.6 MMOL/L — SIGNIFICANT CHANGE UP (ref 3.5–5.3)
POTASSIUM SERPL-SCNC: 4.6 MMOL/L — SIGNIFICANT CHANGE UP (ref 3.5–5.3)
RBC # BLD: 4.24 M/UL — SIGNIFICANT CHANGE UP (ref 3.8–5.2)
RBC # FLD: 14.2 % — SIGNIFICANT CHANGE UP (ref 10.3–14.5)
SODIUM SERPL-SCNC: 136 MMOL/L — SIGNIFICANT CHANGE UP (ref 135–145)
WBC # BLD: 5.96 K/UL — SIGNIFICANT CHANGE UP (ref 3.8–10.5)
WBC # FLD AUTO: 5.96 K/UL — SIGNIFICANT CHANGE UP (ref 3.8–10.5)

## 2024-05-17 PROCEDURE — 71045 X-RAY EXAM CHEST 1 VIEW: CPT | Mod: 26

## 2024-05-17 PROCEDURE — 93970 EXTREMITY STUDY: CPT | Mod: 26

## 2024-05-17 DEVICE — COLLAGEN DURAMATRIX ONLAY PLUS 1X3IN: Type: IMPLANTABLE DEVICE | Status: FUNCTIONAL

## 2024-05-17 DEVICE — DURASEAL SEALANT 5ML: Type: IMPLANTABLE DEVICE | Status: FUNCTIONAL

## 2024-05-17 DEVICE — SURGIFLO MATRIX WITH THROMBIN KIT: Type: IMPLANTABLE DEVICE | Status: FUNCTIONAL

## 2024-05-17 RX ORDER — ONDANSETRON 8 MG/1
1 TABLET, FILM COATED ORAL
Qty: 18 | Refills: 0
Start: 2024-05-17 | End: 2024-05-19

## 2024-05-17 RX ORDER — ONDANSETRON 8 MG/1
4 TABLET, FILM COATED ORAL ONCE
Refills: 0 | Status: DISCONTINUED | OUTPATIENT
Start: 2024-05-17 | End: 2024-05-17

## 2024-05-17 RX ORDER — GABAPENTIN 400 MG/1
300 CAPSULE ORAL EVERY 12 HOURS
Refills: 0 | Status: DISCONTINUED | OUTPATIENT
Start: 2024-05-17 | End: 2024-05-20

## 2024-05-17 RX ORDER — SODIUM CHLORIDE 9 MG/ML
1000 INJECTION, SOLUTION INTRAVENOUS
Refills: 0 | Status: DISCONTINUED | OUTPATIENT
Start: 2024-05-17 | End: 2024-05-20

## 2024-05-17 RX ORDER — ALBUTEROL 90 UG/1
1.25 AEROSOL, METERED ORAL EVERY 4 HOURS
Refills: 0 | Status: DISCONTINUED | OUTPATIENT
Start: 2024-05-17 | End: 2024-05-19

## 2024-05-17 RX ORDER — ONDANSETRON 8 MG/1
4 TABLET, FILM COATED ORAL EVERY 6 HOURS
Refills: 0 | Status: DISCONTINUED | OUTPATIENT
Start: 2024-05-17 | End: 2024-05-20

## 2024-05-17 RX ORDER — SENNA PLUS 8.6 MG/1
2 TABLET ORAL AT BEDTIME
Refills: 0 | Status: DISCONTINUED | OUTPATIENT
Start: 2024-05-17 | End: 2024-05-20

## 2024-05-17 RX ORDER — HYDROMORPHONE HYDROCHLORIDE 2 MG/ML
0.5 INJECTION INTRAMUSCULAR; INTRAVENOUS; SUBCUTANEOUS ONCE
Refills: 0 | Status: DISCONTINUED | OUTPATIENT
Start: 2024-05-17 | End: 2024-05-20

## 2024-05-17 RX ORDER — BUPIVACAINE 13.3 MG/ML
20 INJECTION, SUSPENSION, LIPOSOMAL INFILTRATION ONCE
Refills: 0 | Status: DISCONTINUED | OUTPATIENT
Start: 2024-05-17 | End: 2024-05-17

## 2024-05-17 RX ORDER — OXYCODONE HYDROCHLORIDE 5 MG/1
10 TABLET ORAL EVERY 4 HOURS
Refills: 0 | Status: DISCONTINUED | OUTPATIENT
Start: 2024-05-17 | End: 2024-05-20

## 2024-05-17 RX ORDER — CEFAZOLIN SODIUM 1 G
2000 VIAL (EA) INJECTION ONCE
Refills: 0 | Status: COMPLETED | OUTPATIENT
Start: 2024-05-17 | End: 2024-05-17

## 2024-05-17 RX ORDER — ACETAMINOPHEN 500 MG
1000 TABLET ORAL ONCE
Refills: 0 | Status: COMPLETED | OUTPATIENT
Start: 2024-05-17 | End: 2024-05-18

## 2024-05-17 RX ORDER — OXYCODONE HYDROCHLORIDE 5 MG/1
5 TABLET ORAL ONCE
Refills: 0 | Status: DISCONTINUED | OUTPATIENT
Start: 2024-05-17 | End: 2024-05-17

## 2024-05-17 RX ORDER — CYCLOBENZAPRINE HYDROCHLORIDE 10 MG/1
10 TABLET, FILM COATED ORAL THREE TIMES A DAY
Refills: 0 | Status: DISCONTINUED | OUTPATIENT
Start: 2024-05-17 | End: 2024-05-20

## 2024-05-17 RX ORDER — TRAMADOL HYDROCHLORIDE 50 MG/1
50 TABLET ORAL EVERY 6 HOURS
Refills: 0 | Status: DISCONTINUED | OUTPATIENT
Start: 2024-05-17 | End: 2024-05-20

## 2024-05-17 RX ORDER — MAGNESIUM HYDROXIDE 400 MG/1
30 TABLET, CHEWABLE ORAL DAILY
Refills: 0 | Status: DISCONTINUED | OUTPATIENT
Start: 2024-05-17 | End: 2024-05-20

## 2024-05-17 RX ORDER — PANTOPRAZOLE SODIUM 20 MG/1
40 TABLET, DELAYED RELEASE ORAL
Refills: 0 | Status: DISCONTINUED | OUTPATIENT
Start: 2024-05-17 | End: 2024-05-20

## 2024-05-17 RX ORDER — HYDROMORPHONE HYDROCHLORIDE 2 MG/ML
0.5 INJECTION INTRAMUSCULAR; INTRAVENOUS; SUBCUTANEOUS ONCE
Refills: 0 | Status: DISCONTINUED | OUTPATIENT
Start: 2024-05-17 | End: 2024-05-17

## 2024-05-17 RX ORDER — ACETAMINOPHEN 500 MG
650 TABLET ORAL EVERY 6 HOURS
Refills: 0 | Status: COMPLETED | OUTPATIENT
Start: 2024-05-17 | End: 2024-05-20

## 2024-05-17 RX ORDER — CYCLOBENZAPRINE HYDROCHLORIDE 10 MG/1
1 TABLET, FILM COATED ORAL
Qty: 42 | Refills: 0
Start: 2024-05-17 | End: 2024-05-30

## 2024-05-17 RX ORDER — DOCUSATE SODIUM 100 MG
1 CAPSULE ORAL
Qty: 1 | Refills: 0
Start: 2024-05-17

## 2024-05-17 RX ORDER — OXYCODONE HYDROCHLORIDE 5 MG/1
1 TABLET ORAL
Qty: 20 | Refills: 0
Start: 2024-05-17 | End: 2024-05-21

## 2024-05-17 RX ORDER — SODIUM CHLORIDE 9 MG/ML
1000 INJECTION, SOLUTION INTRAVENOUS
Refills: 0 | Status: DISCONTINUED | OUTPATIENT
Start: 2024-05-17 | End: 2024-05-17

## 2024-05-17 RX ORDER — CEFAZOLIN SODIUM 1 G
2000 VIAL (EA) INJECTION EVERY 8 HOURS
Refills: 0 | Status: COMPLETED | OUTPATIENT
Start: 2024-05-17 | End: 2024-05-18

## 2024-05-17 RX ORDER — BUDESONIDE AND FORMOTEROL FUMARATE DIHYDRATE 160; 4.5 UG/1; UG/1
2 AEROSOL RESPIRATORY (INHALATION)
Refills: 0 | Status: DISCONTINUED | OUTPATIENT
Start: 2024-05-17 | End: 2024-05-20

## 2024-05-17 RX ORDER — POLYETHYLENE GLYCOL 3350 17 G/17G
17 POWDER, FOR SOLUTION ORAL AT BEDTIME
Refills: 0 | Status: DISCONTINUED | OUTPATIENT
Start: 2024-05-17 | End: 2024-05-20

## 2024-05-17 RX ORDER — OXYCODONE HYDROCHLORIDE 5 MG/1
5 TABLET ORAL EVERY 4 HOURS
Refills: 0 | Status: DISCONTINUED | OUTPATIENT
Start: 2024-05-17 | End: 2024-05-20

## 2024-05-17 RX ADMIN — GABAPENTIN 300 MILLIGRAM(S): 400 CAPSULE ORAL at 17:33

## 2024-05-17 RX ADMIN — Medication 650 MILLIGRAM(S): at 17:33

## 2024-05-17 RX ADMIN — Medication 650 MILLIGRAM(S): at 23:26

## 2024-05-17 RX ADMIN — Medication 100 MILLIGRAM(S): at 20:55

## 2024-05-17 RX ADMIN — OXYCODONE HYDROCHLORIDE 10 MILLIGRAM(S): 5 TABLET ORAL at 22:00

## 2024-05-17 RX ADMIN — SODIUM CHLORIDE 60 MILLILITER(S): 9 INJECTION, SOLUTION INTRAVENOUS at 11:06

## 2024-05-17 RX ADMIN — OXYCODONE HYDROCHLORIDE 10 MILLIGRAM(S): 5 TABLET ORAL at 21:32

## 2024-05-17 NOTE — PATIENT PROFILE ADULT - FALL HARM RISK - RISK INTERVENTIONS

## 2024-05-17 NOTE — PATIENT PROFILE ADULT - FUNCTIONAL ASSESSMENT - BASIC MOBILITY 5.
Stop by or call [facility] to check on youth program opportunities.     Address: [facility] [place] [address] Phone: [phone]      
4 = No assist / stand by assistance

## 2024-05-17 NOTE — ASU DISCHARGE PLAN (ADULT/PEDIATRIC) - CARE PROVIDER_API CALL
Fannie Calles  Orthopaedic Surgery  [facility], [address] [address]  Phone: [phone]  Fax: [phone]  Follow Up Time:

## 2024-05-17 NOTE — PROGRESS NOTE ADULT - SUBJECTIVE AND OBJECTIVE BOX
Postop Check    Patient tolerated the procedure well. Patient seen and examined at bedside. No acute complaints at this time. Pain well controlled. Denies weakness, numbness or tingling. Denies chest pain, shortness of breath, nausea or vomiting.     PE:  Vital Signs Last 24 Hrs  T(C): 36.8 (05-17-24 @ 14:56), Max: 36.8 (05-17-24 @ 10:56)  T(F): 98.2 (05-17-24 @ 14:56), Max: 98.3 (05-17-24 @ 10:56)  HR: 91 (05-17-24 @ 15:11) (91 - 105)  BP: 117/62 (05-17-24 @ 15:11) (112/65 - 137/79)  BP(mean): --  RR: 18 (05-17-24 @ 15:11) (16 - 19)  SpO2: 99% (05-17-24 @ 15:11) (98% - 100%)    General: NAD, resting comforatbly in bed  ******UE:   Dressing C/D/I  Drains present  2+ radial pulses  2+ DP Pulses    Motor:                   C5                C6              C7               C8           T1   R             5/5                5/5            5/5              5/5          5/5  L             5/5                5/5            5/5              5/5          5/5                    L2                  L3             L4              L5            S1  R            5/5                5/5             5/5            5/5          5/5  L             5/5                5/5            5/5            5/5          5/5    Sensory:            C5         C6         C7      C8       T1        (0=absent, 1=impaired, 2=normal, NT=not testable)  R         2            2           2        2         2  L          2            2           2        2         2               L2          L3         L4      L5       S1         (0=absent, 1=impaired, 2=normal, NT=not testable)  R         2            2            2        2        2  L          2            2           2        2         2        A/P:  19y f s/p L L4-S1 Lami (5/17/24) w/ Dr. Calles    - WBAT, LSO brace ordered, to be worn when ambulating for comfort.   - HOB flat x24 hrs  -PT/OT   -Pain Control  -DVT ppx: SCDs.   -Continue perioperative abx x 24 hours  -FU AM Labs  -Incentive Spirometry  - FU Pulm C/s for wheezing   - Dispo: Home tomorrow  Postop Check    Patient tolerated the procedure well. Patient seen and examined at bedside. No acute complaints at this time. Pain well controlled. Denies weakness, numbness or tingling. Denies chest pain, shortness of breath, nausea or vomiting.     PE:  Vital Signs Last 24 Hrs  T(C): 36.8 (05-17-24 @ 14:56), Max: 36.8 (05-17-24 @ 10:56)  T(F): 98.2 (05-17-24 @ 14:56), Max: 98.3 (05-17-24 @ 10:56)  HR: 91 (05-17-24 @ 15:11) (91 - 105)  BP: 117/62 (05-17-24 @ 15:11) (112/65 - 137/79)  BP(mean): --  RR: 18 (05-17-24 @ 15:11) (16 - 19)  SpO2: 99% (05-17-24 @ 15:11) (98% - 100%)    General: NAD, resting comforatbly in bed  Dressing C/D/I      Motor:                   C5                C6              C7               C8           T1   R             5/5                5/5            5/5              5/5          5/5  L             5/5                5/5            5/5              5/5          5/5                    L2                  L3             L4              L5            S1  R            5/5                5/5             5/5            5/5          5/5  L             5/5                5/5            5/5            5/5          5/5    Sensory:            C5         C6         C7      C8       T1        (0=absent, 1=impaired, 2=normal, NT=not testable)  R         2            2           2        2         2  L          2            2           2        2         2               L2          L3         L4      L5       S1         (0=absent, 1=impaired, 2=normal, NT=not testable)  R         2            2            2        2        2  L          2            2           2        2         2        A/P:  19y f s/p L L4-S1 Lami (5/17/24) w/ Dr. Calles    - WBAT, LSO brace ordered, to be worn when ambulating for comfort.   - HOB flat x24 hrs  -PT/OT   -Pain Control  -DVT ppx: SCDs.   -Continue perioperative abx x 24 hours  -FU AM Labs  -Incentive Spirometry  - FU Pulm C/s for wheezing   - Dispo: Home tomorrow
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
## 2024-05-17 NOTE — ASU DISCHARGE PLAN (ADULT/PEDIATRIC) - ASU DC SPECIAL INSTRUCTIONSFT
1. Walk plenty  2. No lifting over 5 lbs  3. Use collar for comfort (neck surgery) or LSO brace for comfort (lower back surgery).  4. Keep bandage on, clean and dry. Change to a new one if gets wet or dirty. Ok to shower from waist down (neck surgery only). Make sure you have a bar to hold onto in the shower. If you had low back surgery, sponge bathe until cleared by your surgeon to shower.  5. Pain meds: eRx sent to your pharmacy electronically, you need to pick it up  6. No driving on pain meds  7. See your surgeon in about 10 days in the office. Call to schedule.

## 2024-05-17 NOTE — CONSULT NOTE ADULT - SUBJECTIVE AND OBJECTIVE BOX
CHIEF COMPLAINT/ REASON FOR VISIT  .. Patient was seen to address the  issue listed under PROBLEM LIST which is located toward bottom of this note     YARELY ARDON    PLV 2NOR 231 W1    Allergies    No Known Allergies    Intolerances        PAST MEDICAL & SURGICAL HISTORY:  No pertinent past medical history      No significant past surgical history          FAMILY HISTORY:  FH: cardiovascular disease (Grandparent)    FH: sickle cell anemia (Uncle)        Home Medications:      MEDICATIONS  (STANDING):  acetaminophen     Tablet .. 650 milliGRAM(s) Oral every 6 hours  acetaminophen   IVPB .. 1000 milliGRAM(s) IV Intermittent once  budesonide 160 MICROgram(s)/formoterol 4.5 MICROgram(s) Inhaler 2 Puff(s) Inhalation two times a day  ceFAZolin   IVPB 2000 milliGRAM(s) IV Intermittent every 8 hours  gabapentin 300 milliGRAM(s) Oral every 12 hours  HYDROmorphone  Injectable 0.5 milliGRAM(s) IV Push once  lactated ringers. 1000 milliLiter(s) (125 mL/Hr) IV Continuous <Continuous>  multivitamin 1 Tablet(s) Oral daily  pantoprazole    Tablet 40 milliGRAM(s) Oral before breakfast  polyethylene glycol 3350 17 Gram(s) Oral at bedtime  senna 2 Tablet(s) Oral at bedtime    MEDICATIONS  (PRN):  albuterol   0.042% 1.25 milliGRAM(s) Nebulizer every 4 hours PRN Shortness of Breath and/or Wheezing  aluminum hydroxide/magnesium hydroxide/simethicone Suspension 30 milliLiter(s) Oral four times a day PRN Indigestion  cyclobenzaprine 10 milliGRAM(s) Oral three times a day PRN Muscle Spasm  magnesium hydroxide Suspension 30 milliLiter(s) Oral daily PRN Constipation  ondansetron Injectable 4 milliGRAM(s) IV Push every 6 hours PRN Nausea and/or Vomiting  oxyCODONE    IR 10 milliGRAM(s) Oral every 4 hours PRN Severe Pain (7 - 10)  oxyCODONE    IR 5 milliGRAM(s) Oral every 4 hours PRN Moderate Pain (4 - 6)  traMADol 50 milliGRAM(s) Oral every 6 hours PRN Mild Pain (1 - 3)      Diet, Regular (05-17-24 @ 17:22) [Active]  Diet, Clear Liquid (05-17-24 @ 12:21) [Available for Activation]          Vital Signs Last 24 Hrs  T(C): 36.3 (17 May 2024 16:24), Max: 37 (17 May 2024 15:56)  T(F): 97.4 (17 May 2024 16:24), Max: 98.6 (17 May 2024 15:56)  HR: 84 (17 May 2024 16:24) (84 - 105)  BP: 106/67 (17 May 2024 16:24) (106/67 - 137/79)  BP(mean): --  RR: 18 (17 May 2024 16:24) (16 - 19)  SpO2: 92% (17 May 2024 16:24) (92% - 100%)    Parameters below as of 17 May 2024 16:24  Patient On (Oxygen Delivery Method): room air                  LABS:                        11.3   5.96  )-----------( 232      ( 17 May 2024 15:20 )             34.8     05-17    136  |  110<H>  |  9   ----------------------------<  99  4.6   |  21<L>  |  0.79    Ca    8.8      17 May 2024 15:20        Urinalysis Basic - ( 17 May 2024 15:20 )    Color: x / Appearance: x / SG: x / pH: x  Gluc: 99 mg/dL / Ketone: x  / Bili: x / Urobili: x   Blood: x / Protein: x / Nitrite: x   Leuk Esterase: x / RBC: x / WBC x   Sq Epi: x / Non Sq Epi: x / Bacteria: x            WBC:  WBC Count: 5.96 K/uL (05-17 @ 15:20)      MICROBIOLOGY:  RECENT CULTURES:                  Sodium:  Sodium: 136 mmol/L (05-17 @ 15:20)      0.79 mg/dL 05-17 @ 15:20      Hemoglobin:  Hemoglobin: 11.3 g/dL (05-17 @ 15:20)      Platelets: Platelet Count - Automated: 232 K/uL (05-17 @ 15:20)          Urinalysis Basic - ( 17 May 2024 15:20 )    Color: x / Appearance: x / SG: x / pH: x  Gluc: 99 mg/dL / Ketone: x  / Bili: x / Urobili: x   Blood: x / Protein: x / Nitrite: x   Leuk Esterase: x / RBC: x / WBC x   Sq Epi: x / Non Sq Epi: x / Bacteria: x        RADIOLOGY & ADDITIONAL STUDIES:      MICROBIOLOGY:  RECENT CULTURES:

## 2024-05-17 NOTE — CHART NOTE - NSCHARTNOTEFT_GEN_A_CORE
Pt seen at bedside after surgery to measure and fit with LSO to be used OOB for support.  Pt measured and patient instructed to don and doff  Written instructions and office contact info provided and left with brac.\  Follow up if needed\      Ronnie SPAIN  [phone]

## 2024-05-18 ENCOUNTER — RESULT REVIEW (OUTPATIENT)
Age: 19
End: 2024-05-18

## 2024-05-18 LAB
ANION GAP SERPL CALC-SCNC: 7 MMOL/L — SIGNIFICANT CHANGE UP (ref 5–17)
BUN SERPL-MCNC: 8 MG/DL — SIGNIFICANT CHANGE UP (ref 7–23)
CALCIUM SERPL-MCNC: 8.8 MG/DL — SIGNIFICANT CHANGE UP (ref 8.5–10.1)
CHLORIDE SERPL-SCNC: 107 MMOL/L — SIGNIFICANT CHANGE UP (ref 96–108)
CO2 SERPL-SCNC: 25 MMOL/L — SIGNIFICANT CHANGE UP (ref 22–31)
CREAT SERPL-MCNC: 0.72 MG/DL — SIGNIFICANT CHANGE UP (ref 0.5–1.3)
EGFR: 122 ML/MIN/1.73M2 — SIGNIFICANT CHANGE UP
GLUCOSE SERPL-MCNC: 101 MG/DL — HIGH (ref 70–99)
HCT VFR BLD CALC: 34 % — LOW (ref 34.5–45)
HGB BLD-MCNC: 11.1 G/DL — LOW (ref 11.5–15.5)
MCHC RBC-ENTMCNC: 26.7 PG — LOW (ref 27–34)
MCHC RBC-ENTMCNC: 32.6 GM/DL — SIGNIFICANT CHANGE UP (ref 32–36)
MCV RBC AUTO: 81.9 FL — SIGNIFICANT CHANGE UP (ref 80–100)
NRBC # BLD: 0 /100 WBCS — SIGNIFICANT CHANGE UP (ref 0–0)
PLATELET # BLD AUTO: 257 K/UL — SIGNIFICANT CHANGE UP (ref 150–400)
POTASSIUM SERPL-MCNC: 4.2 MMOL/L — SIGNIFICANT CHANGE UP (ref 3.5–5.3)
POTASSIUM SERPL-SCNC: 4.2 MMOL/L — SIGNIFICANT CHANGE UP (ref 3.5–5.3)
RBC # BLD: 4.15 M/UL — SIGNIFICANT CHANGE UP (ref 3.8–5.2)
RBC # FLD: 14.3 % — SIGNIFICANT CHANGE UP (ref 10.3–14.5)
SODIUM SERPL-SCNC: 139 MMOL/L — SIGNIFICANT CHANGE UP (ref 135–145)
WBC # BLD: 8.93 K/UL — SIGNIFICANT CHANGE UP (ref 3.8–10.5)
WBC # FLD AUTO: 8.93 K/UL — SIGNIFICANT CHANGE UP (ref 3.8–10.5)

## 2024-05-18 PROCEDURE — 88304 TISSUE EXAM BY PATHOLOGIST: CPT | Mod: 26

## 2024-05-18 PROCEDURE — 71275 CT ANGIOGRAPHY CHEST: CPT | Mod: 26

## 2024-05-18 RX ADMIN — OXYCODONE HYDROCHLORIDE 10 MILLIGRAM(S): 5 TABLET ORAL at 21:02

## 2024-05-18 RX ADMIN — Medication 1 TABLET(S): at 12:03

## 2024-05-18 RX ADMIN — ONDANSETRON 4 MILLIGRAM(S): 8 TABLET, FILM COATED ORAL at 08:25

## 2024-05-18 RX ADMIN — OXYCODONE HYDROCHLORIDE 10 MILLIGRAM(S): 5 TABLET ORAL at 03:50

## 2024-05-18 RX ADMIN — GABAPENTIN 300 MILLIGRAM(S): 400 CAPSULE ORAL at 05:33

## 2024-05-18 RX ADMIN — SENNA PLUS 2 TABLET(S): 8.6 TABLET ORAL at 21:02

## 2024-05-18 RX ADMIN — Medication 400 MILLIGRAM(S): at 19:52

## 2024-05-18 RX ADMIN — PANTOPRAZOLE SODIUM 40 MILLIGRAM(S): 20 TABLET, DELAYED RELEASE ORAL at 05:33

## 2024-05-18 RX ADMIN — POLYETHYLENE GLYCOL 3350 17 GRAM(S): 17 POWDER, FOR SOLUTION ORAL at 21:03

## 2024-05-18 RX ADMIN — Medication 650 MILLIGRAM(S): at 17:40

## 2024-05-18 RX ADMIN — OXYCODONE HYDROCHLORIDE 10 MILLIGRAM(S): 5 TABLET ORAL at 09:21

## 2024-05-18 RX ADMIN — Medication 650 MILLIGRAM(S): at 12:02

## 2024-05-18 RX ADMIN — CYCLOBENZAPRINE HYDROCHLORIDE 10 MILLIGRAM(S): 10 TABLET, FILM COATED ORAL at 18:54

## 2024-05-18 RX ADMIN — OXYCODONE HYDROCHLORIDE 10 MILLIGRAM(S): 5 TABLET ORAL at 17:07

## 2024-05-18 RX ADMIN — OXYCODONE HYDROCHLORIDE 10 MILLIGRAM(S): 5 TABLET ORAL at 04:30

## 2024-05-18 RX ADMIN — Medication 650 MILLIGRAM(S): at 18:40

## 2024-05-18 RX ADMIN — Medication 650 MILLIGRAM(S): at 23:45

## 2024-05-18 RX ADMIN — OXYCODONE HYDROCHLORIDE 10 MILLIGRAM(S): 5 TABLET ORAL at 16:07

## 2024-05-18 RX ADMIN — Medication 650 MILLIGRAM(S): at 13:02

## 2024-05-18 RX ADMIN — Medication 650 MILLIGRAM(S): at 00:00

## 2024-05-18 RX ADMIN — Medication 650 MILLIGRAM(S): at 05:33

## 2024-05-18 RX ADMIN — ONDANSETRON 4 MILLIGRAM(S): 8 TABLET, FILM COATED ORAL at 18:57

## 2024-05-18 RX ADMIN — OXYCODONE HYDROCHLORIDE 10 MILLIGRAM(S): 5 TABLET ORAL at 21:32

## 2024-05-18 RX ADMIN — Medication 100 MILLIGRAM(S): at 03:40

## 2024-05-18 RX ADMIN — OXYCODONE HYDROCHLORIDE 10 MILLIGRAM(S): 5 TABLET ORAL at 08:21

## 2024-05-18 RX ADMIN — BUDESONIDE AND FORMOTEROL FUMARATE DIHYDRATE 2 PUFF(S): 160; 4.5 AEROSOL RESPIRATORY (INHALATION) at 19:53

## 2024-05-18 RX ADMIN — GABAPENTIN 300 MILLIGRAM(S): 400 CAPSULE ORAL at 17:40

## 2024-05-18 RX ADMIN — Medication 1000 MILLIGRAM(S): at 20:07

## 2024-05-18 RX ADMIN — Medication 650 MILLIGRAM(S): at 06:00

## 2024-05-18 NOTE — CASE MANAGEMENT PROGRESS NOTE - NSCMPROGRESSNOTE_GEN_ALL_CORE
Patient discussed in rounds and remains acute on HOB restrictions until 1500 hours. Patient to lay flat.  Pending PT/OT eval and CT angio of the chest. Discharge disposition is home-- pending evaluation for skill needs. CM remains available throughout hospital stay.
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
## 2024-05-18 NOTE — PROGRESS NOTE ADULT - SUBJECTIVE AND OBJECTIVE BOX
CHIEF COMPLAINT/ REASON FOR VISIT  .. Patient was seen to address the  issue listed under PROBLEM LIST which is located toward bottom of this note     YARELY ARDON    PLV 2NOR [address] W1    Allergies    No Known Allergies    Intolerances        PAST MEDICAL & SURGICAL HISTORY:  No pertinent past medical history      No significant past surgical history          FAMILY HISTORY:  FH: cardiovascular disease (Grandparent)    FH: sickle cell anemia (Uncle)        Home Medications:      MEDICATIONS  (STANDING):  acetaminophen     Tablet .. 650 milliGRAM(s) Oral every 6 hours  acetaminophen   IVPB .. 1000 milliGRAM(s) IV Intermittent once  budesonide 160 MICROgram(s)/formoterol 4.5 MICROgram(s) Inhaler 2 Puff(s) Inhalation two times a day  gabapentin 300 milliGRAM(s) Oral every 12 hours  HYDROmorphone  Injectable 0.5 milliGRAM(s) IV Push once  lactated ringers. 1000 milliLiter(s) (125 mL/Hr) IV Continuous <Continuous>  multivitamin 1 Tablet(s) Oral daily  pantoprazole    Tablet 40 milliGRAM(s) Oral before breakfast  polyethylene glycol 3350 17 Gram(s) Oral at bedtime  senna 2 Tablet(s) Oral at bedtime    MEDICATIONS  (PRN):  albuterol   0.042% 1.25 milliGRAM(s) Nebulizer every 4 hours PRN Shortness of Breath and/or Wheezing  aluminum hydroxide/magnesium hydroxide/simethicone Suspension 30 milliLiter(s) Oral four times a day PRN Indigestion  cyclobenzaprine 10 milliGRAM(s) Oral three times a day PRN Muscle Spasm  magnesium hydroxide Suspension 30 milliLiter(s) Oral daily PRN Constipation  ondansetron Injectable 4 milliGRAM(s) IV Push every 6 hours PRN Nausea and/or Vomiting  oxyCODONE    IR 5 milliGRAM(s) Oral every 4 hours PRN Moderate Pain (4 - 6)  oxyCODONE    IR 10 milliGRAM(s) Oral every 4 hours PRN Severe Pain (7 - 10)  traMADol 50 milliGRAM(s) Oral every 6 hours PRN Mild Pain (1 - 3)      Diet, Regular (05-17-24 @ 17:22) [Active]  Diet, Clear Liquid (05-17-24 @ 12:21) [Available for Activation]          Vital Signs Last 24 Hrs  T(C): 36.9 (18 May 2024 12:00), Max: 36.9 (18 May 2024 12:00)  T(F): 98.5 (18 May 2024 12:00), Max: 98.5 (18 May 2024 12:00)  HR: 74 (18 May 2024 12:00) (70 - 82)  BP: 104/65 (18 May 2024 12:00) (100/65 - 122/69)  BP(mean): --  RR: 18 (18 May 2024 12:00) (18 - 18)  SpO2: 95% (18 May 2024 12:00) (95% - 97%)    Parameters below as of 18 May 2024 12:00  Patient On (Oxygen Delivery Method): room air          05-17-24 @ 07:01  -  05-18-24 @ 07:00  --------------------------------------------------------  IN: 1575 mL / OUT: 3100 mL / NET: -1525 mL    05-18-24 @ 07:01  -  05-18-24 @ 17:29  --------------------------------------------------------  IN: 180 mL / OUT: 1000 mL / NET: -820 mL              LABS:                        11.1   8.93  )-----------( 257      ( 18 May 2024 07:00 )             34.0     05-18    139  |  107  |  8   ----------------------------<  101<H>  4.2   |  25  |  0.72    Ca    8.8      18 May 2024 07:00        Urinalysis Basic - ( 18 May 2024 07:00 )    Color: x / Appearance: x / SG: x / pH: x  Gluc: 101 mg/dL / Ketone: x  / Bili: x / Urobili: x   Blood: x / Protein: x / Nitrite: x   Leuk Esterase: x / RBC: x / WBC x   Sq Epi: x / Non Sq Epi: x / Bacteria: x            WBC:  WBC Count: 8.93 K/uL (05-18 @ 07:00)  WBC Count: 5.96 K/uL (05-17 @ 15:20)      MICROBIOLOGY:  RECENT CULTURES:                  Sodium:  Sodium: 139 mmol/L (05-18 @ 07:00)  Sodium: 136 mmol/L (05-17 @ 15:20)      0.72 mg/dL 05-18 @ 07:00  0.79 mg/dL 05-17 @ 15:20      Hemoglobin:  Hemoglobin: 11.1 g/dL (05-18 @ 07:00)  Hemoglobin: 11.3 g/dL (05-17 @ 15:20)      Platelets: Platelet Count - Automated: 257 K/uL (05-18 @ 07:00)  Platelet Count - Automated: 232 K/uL (05-17 @ 15:20)          Urinalysis Basic - ( 18 May 2024 07:00 )    Color: x / Appearance: x / SG: x / pH: x  Gluc: 101 mg/dL / Ketone: x  / Bili: x / Urobili: x   Blood: x / Protein: x / Nitrite: x   Leuk Esterase: x / RBC: x / WBC x   Sq Epi: x / Non Sq Epi: x / Bacteria: x        RADIOLOGY & ADDITIONAL STUDIES:      MICROBIOLOGY:  RECENT CULTURES:           Statement Selected

## 2024-05-18 NOTE — PROGRESS NOTE ADULT - SUBJECTIVE AND OBJECTIVE BOX
Patient seen and examined at bedside. No acute complaints at this time. Pain well controlled. Denies weakness, numbness or tingling. Denies chest pain, shortness of breath, nausea or vomiting.     PE:  Vital Signs (24 Hrs):  T(C): 36.8 (05-18-24 @ 03:42), Max: 37 (05-17-24 @ 15:56)  HR: 82 (05-18-24 @ 03:42) (70 - 105)  BP: 116/74 (05-18-24 @ 03:42) (100/65 - 137/79)  RR: 18 (05-18-24 @ 03:42) (16 - 19)  SpO2: 97% (05-18-24 @ 03:42) (92% - 100%)  Wt(kg): --    LABS:                          11.3   5.96  )-----------( 232      ( 17 May 2024 15:20 )             34.8     05-17    136  |  110<H>  |  9   ----------------------------<  99  4.6   |  21<L>  |  0.79    Ca    8.8      17 May 2024 15:20          General: NAD, resting comforatbly in bed  Dressing C/D/I      Motor:                   C5                C6              C7               C8           T1   R             5/5                5/5            5/5              5/5          5/5  L             5/5                5/5            5/5              5/5          5/5                    L2                  L3*             L4              L5            S1  R            5/5                 4/5             5/5            5/5          5/5  L             5/5                4/5            5/5            5/5          5/5    *Limited due to pain    Sensory:            C5         C6         C7      C8       T1        (0=absent, 1=impaired, 2=normal, NT=not testable)  R         2            2           2        2         2  L          2            2           2        2         2               L2          L3         L4      L5       S1         (0=absent, 1=impaired, 2=normal, NT=not testable)  R         2            2            2        2        2  L          2            2           2        2         2        A/P:  19y f s/p L L4-S1 [place] (5/17/24) w/ Dr. Calles    - WBAT, LSO brace ordered, to be worn when ambulating for comfort.   - HOB flat x24 hrs  -PT/OT   -Pain Control  -DVT ppx: SCDs.   -Continue perioperative abx x 24 hours  -FU AM Labs  -Incentive Spirometry  - Pulm recs appreciated.   - Dispo: Home; re-eval later for home today vs tmw

## 2024-05-18 NOTE — CHART NOTE - NSCHARTNOTEFT_GEN_A_CORE
Pulmonology recs to get a CT angio chest.  This was ordered, to not be done until HOB restrictions are lifted; this is expected at 1500.

## 2024-05-19 LAB
ANION GAP SERPL CALC-SCNC: 3 MMOL/L — LOW (ref 5–17)
BUN SERPL-MCNC: 11 MG/DL — SIGNIFICANT CHANGE UP (ref 7–23)
CALCIUM SERPL-MCNC: 8.3 MG/DL — LOW (ref 8.5–10.1)
CHLORIDE SERPL-SCNC: 108 MMOL/L — SIGNIFICANT CHANGE UP (ref 96–108)
CO2 SERPL-SCNC: 28 MMOL/L — SIGNIFICANT CHANGE UP (ref 22–31)
CREAT SERPL-MCNC: 0.66 MG/DL — SIGNIFICANT CHANGE UP (ref 0.5–1.3)
EGFR: 128 ML/MIN/1.73M2 — SIGNIFICANT CHANGE UP
GLUCOSE SERPL-MCNC: 86 MG/DL — SIGNIFICANT CHANGE UP (ref 70–99)
HCT VFR BLD CALC: 32.6 % — LOW (ref 34.5–45)
HGB BLD-MCNC: 10.5 G/DL — LOW (ref 11.5–15.5)
MCHC RBC-ENTMCNC: 26.8 PG — LOW (ref 27–34)
MCHC RBC-ENTMCNC: 32.2 GM/DL — SIGNIFICANT CHANGE UP (ref 32–36)
MCV RBC AUTO: 83.2 FL — SIGNIFICANT CHANGE UP (ref 80–100)
NRBC # BLD: 0 /100 WBCS — SIGNIFICANT CHANGE UP (ref 0–0)
PLATELET # BLD AUTO: 233 K/UL — SIGNIFICANT CHANGE UP (ref 150–400)
POTASSIUM SERPL-MCNC: 4 MMOL/L — SIGNIFICANT CHANGE UP (ref 3.5–5.3)
POTASSIUM SERPL-SCNC: 4 MMOL/L — SIGNIFICANT CHANGE UP (ref 3.5–5.3)
RBC # BLD: 3.92 M/UL — SIGNIFICANT CHANGE UP (ref 3.8–5.2)
RBC # FLD: 14.6 % — HIGH (ref 10.3–14.5)
SODIUM SERPL-SCNC: 139 MMOL/L — SIGNIFICANT CHANGE UP (ref 135–145)
WBC # BLD: 7.22 K/UL — SIGNIFICANT CHANGE UP (ref 3.8–10.5)
WBC # FLD AUTO: 7.22 K/UL — SIGNIFICANT CHANGE UP (ref 3.8–10.5)

## 2024-05-19 RX ORDER — ALBUTEROL 90 UG/1
2.5 AEROSOL, METERED ORAL EVERY 4 HOURS
Refills: 0 | Status: DISCONTINUED | OUTPATIENT
Start: 2024-05-19 | End: 2024-05-20

## 2024-05-19 RX ORDER — HYDROMORPHONE HYDROCHLORIDE 2 MG/ML
0.5 INJECTION INTRAMUSCULAR; INTRAVENOUS; SUBCUTANEOUS ONCE
Refills: 0 | Status: DISCONTINUED | OUTPATIENT
Start: 2024-05-19 | End: 2024-05-19

## 2024-05-19 RX ORDER — SODIUM CHLORIDE 9 MG/ML
1000 INJECTION, SOLUTION INTRAVENOUS
Refills: 0 | Status: DISCONTINUED | OUTPATIENT
Start: 2024-05-19 | End: 2024-05-20

## 2024-05-19 RX ADMIN — GABAPENTIN 300 MILLIGRAM(S): 400 CAPSULE ORAL at 05:20

## 2024-05-19 RX ADMIN — OXYCODONE HYDROCHLORIDE 10 MILLIGRAM(S): 5 TABLET ORAL at 20:09

## 2024-05-19 RX ADMIN — OXYCODONE HYDROCHLORIDE 10 MILLIGRAM(S): 5 TABLET ORAL at 23:46

## 2024-05-19 RX ADMIN — BUDESONIDE AND FORMOTEROL FUMARATE DIHYDRATE 2 PUFF(S): 160; 4.5 AEROSOL RESPIRATORY (INHALATION) at 07:08

## 2024-05-19 RX ADMIN — POLYETHYLENE GLYCOL 3350 17 GRAM(S): 17 POWDER, FOR SOLUTION ORAL at 21:35

## 2024-05-19 RX ADMIN — OXYCODONE HYDROCHLORIDE 10 MILLIGRAM(S): 5 TABLET ORAL at 09:32

## 2024-05-19 RX ADMIN — OXYCODONE HYDROCHLORIDE 10 MILLIGRAM(S): 5 TABLET ORAL at 05:08

## 2024-05-19 RX ADMIN — BUDESONIDE AND FORMOTEROL FUMARATE DIHYDRATE 2 PUFF(S): 160; 4.5 AEROSOL RESPIRATORY (INHALATION) at 18:51

## 2024-05-19 RX ADMIN — SENNA PLUS 2 TABLET(S): 8.6 TABLET ORAL at 21:34

## 2024-05-19 RX ADMIN — Medication 650 MILLIGRAM(S): at 05:19

## 2024-05-19 RX ADMIN — OXYCODONE HYDROCHLORIDE 10 MILLIGRAM(S): 5 TABLET ORAL at 14:57

## 2024-05-19 RX ADMIN — SODIUM CHLORIDE 150 MILLILITER(S): 9 INJECTION, SOLUTION INTRAVENOUS at 16:52

## 2024-05-19 RX ADMIN — Medication 650 MILLIGRAM(S): at 18:00

## 2024-05-19 RX ADMIN — CYCLOBENZAPRINE HYDROCHLORIDE 10 MILLIGRAM(S): 10 TABLET, FILM COATED ORAL at 19:39

## 2024-05-19 RX ADMIN — OXYCODONE HYDROCHLORIDE 10 MILLIGRAM(S): 5 TABLET ORAL at 16:00

## 2024-05-19 RX ADMIN — Medication 650 MILLIGRAM(S): at 23:44

## 2024-05-19 RX ADMIN — OXYCODONE HYDROCHLORIDE 10 MILLIGRAM(S): 5 TABLET ORAL at 12:14

## 2024-05-19 RX ADMIN — CYCLOBENZAPRINE HYDROCHLORIDE 10 MILLIGRAM(S): 10 TABLET, FILM COATED ORAL at 04:38

## 2024-05-19 RX ADMIN — Medication 650 MILLIGRAM(S): at 05:50

## 2024-05-19 RX ADMIN — Medication 650 MILLIGRAM(S): at 00:15

## 2024-05-19 RX ADMIN — Medication 650 MILLIGRAM(S): at 12:14

## 2024-05-19 RX ADMIN — Medication 650 MILLIGRAM(S): at 11:19

## 2024-05-19 RX ADMIN — HYDROMORPHONE HYDROCHLORIDE 0.5 MILLIGRAM(S): 2 INJECTION INTRAMUSCULAR; INTRAVENOUS; SUBCUTANEOUS at 21:34

## 2024-05-19 RX ADMIN — OXYCODONE HYDROCHLORIDE 10 MILLIGRAM(S): 5 TABLET ORAL at 19:39

## 2024-05-19 RX ADMIN — Medication 1 TABLET(S): at 11:19

## 2024-05-19 RX ADMIN — GABAPENTIN 300 MILLIGRAM(S): 400 CAPSULE ORAL at 17:21

## 2024-05-19 RX ADMIN — Medication 650 MILLIGRAM(S): at 17:22

## 2024-05-19 RX ADMIN — OXYCODONE HYDROCHLORIDE 10 MILLIGRAM(S): 5 TABLET ORAL at 04:38

## 2024-05-19 RX ADMIN — PANTOPRAZOLE SODIUM 40 MILLIGRAM(S): 20 TABLET, DELAYED RELEASE ORAL at 05:19

## 2024-05-19 RX ADMIN — HYDROMORPHONE HYDROCHLORIDE 0.5 MILLIGRAM(S): 2 INJECTION INTRAMUSCULAR; INTRAVENOUS; SUBCUTANEOUS at 21:49
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
## 2024-05-19 NOTE — PROGRESS NOTE ADULT - SUBJECTIVE AND OBJECTIVE BOX
Patient seen and examined at bedside. No acute complaints at this time. Pain well controlled. Denies weakness, numbness or tingling. Denies chest pain, shortness of breath, or vomiting. Patient with one HA overnight, now gone.     PE:    Vital Signs (24 Hrs):  T(C): 37.5 (05-19-24 @ 04:13), Max: 37.5 (05-19-24 @ 04:13)  HR: 91 (05-19-24 @ 04:13) (74 - 91)  BP: 101/67 (05-19-24 @ 04:13) (101/67 - 104/65)  RR: 18 (05-19-24 @ 04:13) (18 - 18)  SpO2: 98% (05-19-24 @ 04:13) (95% - 98%)  Wt(kg): --    LABS:                          11.1   8.93  )-----------( 257      ( 18 May 2024 07:00 )             34.0     05-18    139  |  107  |  8   ----------------------------<  101<H>  4.2   |  25  |  0.72    Ca    8.8      18 May 2024 07:00            General: NAD, resting comfortably in bed  Dressing C/D/I      Motor:                   C5                C6              C7               C8           T1   R             5/5                5/5            5/5              5/5          5/5  L             5/5                5/5            5/5              5/5          5/5                    L2                  L3*             L4              L5            S1  R            5/5                 4/5             5/5            5/5          5/5  L             5/5                4/5            5/5            5/5          5/5    *Limited due to pain    Sensory:            C5         C6         C7      C8       T1        (0=absent, 1=impaired, 2=normal, NT=not testable)  R         2            2           2        2         2  L          2            2           2        2         2               L2          L3         L4      L5       S1         (0=absent, 1=impaired, 2=normal, NT=not testable)  R         2            2            2        2        2  L          2            2           2        2         2        A/P:  19y f s/p L L4-S1 Lami (5/17/24) w/ Dr. Calles    - WBAT, LSO brace   -PT/OT   -Pain Control  -DVT ppx: SCDs.   -Continue perioperative abx x 24 hours  -FU AM Labs  -Incentive Spirometry  - Pulm recs appreciated.   - Dispo: Home; needs to clear PT

## 2024-05-19 NOTE — PROGRESS NOTE ADULT - SUBJECTIVE AND OBJECTIVE BOX
CHIEF COMPLAINT/ REASON FOR VISIT  .. Patient was seen to address the  issue listed under PROBLEM LIST which is located toward bottom of this note     YARELY ARDON    PLV [address]    Allergies    No Known Allergies    Intolerances        PAST MEDICAL & SURGICAL HISTORY:  No pertinent past medical history      No significant past surgical history          FAMILY HISTORY:  FH: cardiovascular disease (Grandparent)    FH: sickle cell anemia (Uncle)        Home Medications:      MEDICATIONS  (STANDING):  acetaminophen     Tablet .. 650 milliGRAM(s) Oral every 6 hours  budesonide 160 MICROgram(s)/formoterol 4.5 MICROgram(s) Inhaler 2 Puff(s) Inhalation two times a day  gabapentin 300 milliGRAM(s) Oral every 12 hours  HYDROmorphone  Injectable 0.5 milliGRAM(s) IV Push once  lactated ringers. 1000 milliLiter(s) (125 mL/Hr) IV Continuous <Continuous>  multivitamin 1 Tablet(s) Oral daily  pantoprazole    Tablet 40 milliGRAM(s) Oral before breakfast  polyethylene glycol 3350 17 Gram(s) Oral at bedtime  senna 2 Tablet(s) Oral at bedtime    MEDICATIONS  (PRN):  albuterol    0.083% 2.5 milliGRAM(s) Nebulizer every 4 hours PRN Shortness of Breath and/or Wheezing  aluminum hydroxide/magnesium hydroxide/simethicone Suspension 30 milliLiter(s) Oral four times a day PRN Indigestion  cyclobenzaprine 10 milliGRAM(s) Oral three times a day PRN Muscle Spasm  magnesium hydroxide Suspension 30 milliLiter(s) Oral daily PRN Constipation  ondansetron Injectable 4 milliGRAM(s) IV Push every 6 hours PRN Nausea and/or Vomiting  oxyCODONE    IR 5 milliGRAM(s) Oral every 4 hours PRN Moderate Pain (4 - 6)  oxyCODONE    IR 10 milliGRAM(s) Oral every 4 hours PRN Severe Pain (7 - 10)  traMADol 50 milliGRAM(s) Oral every 6 hours PRN Mild Pain (1 - 3)      Diet, Regular (05-17-24 @ 17:22) [Active]  Diet, Clear Liquid (05-17-24 @ 12:21) [Available for Activation]          Vital Signs Last 24 Hrs  T(C): 36.8 (19 May 2024 08:51), Max: 37.5 (19 May 2024 04:13)  T(F): 98.2 (19 May 2024 08:51), Max: 99.5 (19 May 2024 04:13)  HR: 100 (19 May 2024 08:51) (74 - 100)  BP: 104/70 (19 May 2024 08:51) (101/67 - 104/70)  BP(mean): --  RR: 18 (19 May 2024 08:51) (18 - 18)  SpO2: 98% (19 May 2024 08:51) (95% - 98%)    Parameters below as of 19 May 2024 08:51  Patient On (Oxygen Delivery Method): room air          05-18-24 @ 07:01  -  05-19-24 @ 07:00  --------------------------------------------------------  IN: 180 mL / OUT: 1900 mL / NET: -1720 mL              LABS:                        10.5   7.22  )-----------( 233      ( 19 May 2024 07:25 )             32.6     05-19    139  |  108  |  11  ----------------------------<  86  4.0   |  28  |  0.66    Ca    8.3<L>      19 May 2024 07:25        Urinalysis Basic - ( 19 May 2024 07:25 )    Color: x / Appearance: x / SG: x / pH: x  Gluc: 86 mg/dL / Ketone: x  / Bili: x / Urobili: x   Blood: x / Protein: x / Nitrite: x   Leuk Esterase: x / RBC: x / WBC x   Sq Epi: x / Non Sq Epi: x / Bacteria: x            WBC:  WBC Count: 7.22 K/uL (05-19 @ 07:25)  WBC Count: 8.93 K/uL (05-18 @ 07:00)  WBC Count: 5.96 K/uL (05-17 @ 15:20)      MICROBIOLOGY:  RECENT CULTURES:                  Sodium:  Sodium: 139 mmol/L (05-19 @ 07:25)  Sodium: 139 mmol/L (05-18 @ 07:00)  Sodium: 136 mmol/L (05-17 @ 15:20)      0.66 mg/dL 05-19 @ 07:25  0.72 mg/dL 05-18 @ 07:00  0.79 mg/dL 05-17 @ 15:20      Hemoglobin:  Hemoglobin: 10.5 g/dL (05-19 @ 07:25)  Hemoglobin: 11.1 g/dL (05-18 @ 07:00)  Hemoglobin: 11.3 g/dL (05-17 @ 15:20)      Platelets: Platelet Count - Automated: 233 K/uL (05-19 @ 07:25)  Platelet Count - Automated: 257 K/uL (05-18 @ 07:00)  Platelet Count - Automated: 232 K/uL (05-17 @ 15:20)          Urinalysis Basic - ( 19 May 2024 07:25 )    Color: x / Appearance: x / SG: x / pH: x  Gluc: 86 mg/dL / Ketone: x  / Bili: x / Urobili: x   Blood: x / Protein: x / Nitrite: x   Leuk Esterase: x / RBC: x / WBC x   Sq Epi: x / Non Sq Epi: x / Bacteria: x        RADIOLOGY & ADDITIONAL STUDIES:      MICROBIOLOGY:  RECENT CULTURES:

## 2024-05-19 NOTE — CHART NOTE - NSCHARTNOTEFT_GEN_A_CORE
Pt c/o headaches this am.  Will have her lay flat in bed until 1300.  PT can try again after and if headaches return patient will need to be supine until resolved.

## 2024-05-20 ENCOUNTER — TRANSCRIPTION ENCOUNTER (OUTPATIENT)
Age: 19
End: 2024-05-20

## 2024-05-20 VITALS
DIASTOLIC BLOOD PRESSURE: 63 MMHG | OXYGEN SATURATION: 98 % | SYSTOLIC BLOOD PRESSURE: 97 MMHG | RESPIRATION RATE: 18 BRPM | HEART RATE: 92 BPM | TEMPERATURE: 98 F

## 2024-05-20 LAB
ANION GAP SERPL CALC-SCNC: 3 MMOL/L — LOW (ref 5–17)
BUN SERPL-MCNC: 6 MG/DL — LOW (ref 7–23)
CALCIUM SERPL-MCNC: 8.5 MG/DL — SIGNIFICANT CHANGE UP (ref 8.5–10.1)
CHLORIDE SERPL-SCNC: 102 MMOL/L — SIGNIFICANT CHANGE UP (ref 96–108)
CO2 SERPL-SCNC: 31 MMOL/L — SIGNIFICANT CHANGE UP (ref 22–31)
CREAT SERPL-MCNC: 0.61 MG/DL — SIGNIFICANT CHANGE UP (ref 0.5–1.3)
EGFR: 130 ML/MIN/1.73M2 — SIGNIFICANT CHANGE UP
GLUCOSE SERPL-MCNC: 77 MG/DL — SIGNIFICANT CHANGE UP (ref 70–99)
HCT VFR BLD CALC: 31.9 % — LOW (ref 34.5–45)
HGB BLD-MCNC: 10.1 G/DL — LOW (ref 11.5–15.5)
MCHC RBC-ENTMCNC: 26.7 PG — LOW (ref 27–34)
MCHC RBC-ENTMCNC: 31.7 GM/DL — LOW (ref 32–36)
MCV RBC AUTO: 84.4 FL — SIGNIFICANT CHANGE UP (ref 80–100)
NRBC # BLD: 0 /100 WBCS — SIGNIFICANT CHANGE UP (ref 0–0)
PLATELET # BLD AUTO: 227 K/UL — SIGNIFICANT CHANGE UP (ref 150–400)
POTASSIUM SERPL-MCNC: 4.3 MMOL/L — SIGNIFICANT CHANGE UP (ref 3.5–5.3)
POTASSIUM SERPL-SCNC: 4.3 MMOL/L — SIGNIFICANT CHANGE UP (ref 3.5–5.3)
RBC # BLD: 3.78 M/UL — LOW (ref 3.8–5.2)
RBC # FLD: 14.6 % — HIGH (ref 10.3–14.5)
SODIUM SERPL-SCNC: 136 MMOL/L — SIGNIFICANT CHANGE UP (ref 135–145)
WBC # BLD: 7.78 K/UL — SIGNIFICANT CHANGE UP (ref 3.8–10.5)
WBC # FLD AUTO: 7.78 K/UL — SIGNIFICANT CHANGE UP (ref 3.8–10.5)

## 2024-05-20 PROCEDURE — 97165 OT EVAL LOW COMPLEX 30 MIN: CPT

## 2024-05-20 PROCEDURE — C1889: CPT

## 2024-05-20 PROCEDURE — 85027 COMPLETE CBC AUTOMATED: CPT

## 2024-05-20 PROCEDURE — 80048 BASIC METABOLIC PNL TOTAL CA: CPT

## 2024-05-20 PROCEDURE — 97116 GAIT TRAINING THERAPY: CPT

## 2024-05-20 PROCEDURE — 97162 PT EVAL MOD COMPLEX 30 MIN: CPT

## 2024-05-20 PROCEDURE — 88304 TISSUE EXAM BY PATHOLOGIST: CPT

## 2024-05-20 PROCEDURE — 86901 BLOOD TYPING SEROLOGIC RH(D): CPT

## 2024-05-20 PROCEDURE — 81025 URINE PREGNANCY TEST: CPT

## 2024-05-20 PROCEDURE — 86900 BLOOD TYPING SEROLOGIC ABO: CPT

## 2024-05-20 PROCEDURE — 86850 RBC ANTIBODY SCREEN: CPT

## 2024-05-20 PROCEDURE — 93970 EXTREMITY STUDY: CPT

## 2024-05-20 PROCEDURE — 76000 FLUOROSCOPY <1 HR PHYS/QHP: CPT

## 2024-05-20 PROCEDURE — 71275 CT ANGIOGRAPHY CHEST: CPT | Mod: MC

## 2024-05-20 PROCEDURE — 94640 AIRWAY INHALATION TREATMENT: CPT

## 2024-05-20 PROCEDURE — 36415 COLL VENOUS BLD VENIPUNCTURE: CPT

## 2024-05-20 PROCEDURE — 71045 X-RAY EXAM CHEST 1 VIEW: CPT

## 2024-05-20 PROCEDURE — 97530 THERAPEUTIC ACTIVITIES: CPT

## 2024-05-20 RX ADMIN — Medication 650 MILLIGRAM(S): at 11:57

## 2024-05-20 RX ADMIN — OXYCODONE HYDROCHLORIDE 10 MILLIGRAM(S): 5 TABLET ORAL at 00:15

## 2024-05-20 RX ADMIN — Medication 1 TABLET(S): at 11:23

## 2024-05-20 RX ADMIN — Medication 650 MILLIGRAM(S): at 00:15

## 2024-05-20 RX ADMIN — PANTOPRAZOLE SODIUM 40 MILLIGRAM(S): 20 TABLET, DELAYED RELEASE ORAL at 05:10

## 2024-05-20 RX ADMIN — BUDESONIDE AND FORMOTEROL FUMARATE DIHYDRATE 2 PUFF(S): 160; 4.5 AEROSOL RESPIRATORY (INHALATION) at 06:55

## 2024-05-20 RX ADMIN — Medication 650 MILLIGRAM(S): at 05:37

## 2024-05-20 RX ADMIN — OXYCODONE HYDROCHLORIDE 10 MILLIGRAM(S): 5 TABLET ORAL at 04:30

## 2024-05-20 RX ADMIN — GABAPENTIN 300 MILLIGRAM(S): 400 CAPSULE ORAL at 05:09

## 2024-05-20 RX ADMIN — Medication 650 MILLIGRAM(S): at 11:23

## 2024-05-20 RX ADMIN — Medication 650 MILLIGRAM(S): at 05:07

## 2024-05-20 RX ADMIN — OXYCODONE HYDROCHLORIDE 10 MILLIGRAM(S): 5 TABLET ORAL at 04:00
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
## 2024-05-20 NOTE — DISCHARGE NOTE PROVIDER - NSDCACTIVITY_GEN_ALL_CORE
DISPLAY PLAN FREE TEXT
I, Rogelio Amaya, performed a history and physical exam of the patient and discussed their management with the resident and/or advanced care provider. I reviewed the resident and/or advanced care provider's note and agree with the documented findings and plan of care except where noted. I was present and available for all procedures.     I wrote H&P and MDM.
Follow Instructions Provided by your Surgical Team

## 2024-05-20 NOTE — DISCHARGE NOTE NURSING/CASE MANAGEMENT/SOCIAL WORK - NSDCPEFALRISK_GEN_ALL_CORE
For information on Fall & Injury Prevention, visit: [email or web address] OR  [email or web address] OR  https://www.cdc.gov/steadi/patient.html

## 2024-05-20 NOTE — PROGRESS NOTE ADULT - SUBJECTIVE AND OBJECTIVE BOX
Patient seen and examined at bedside. No acute complaints at this time. Pain well controlled. Denies weakness, numbness or tingling. Denies chest pain, shortness of breath, or vomiting. Patient with one HA overnight, now gone.     PE:    Vital Signs (24 Hrs):  T(C): 36.9 (05-20-24 @ 05:18), Max: 37.8 (05-19-24 @ 16:23)  HR: 93 (05-20-24 @ 05:18) (93 - 104)  BP: 110/73 (05-20-24 @ 05:18) (101/66 - 112/71)  RR: 18 (05-20-24 @ 05:18) (18 - 20)  SpO2: 97% (05-20-24 @ 05:18) (97% - 99%)  Wt(kg): --    LABS:                          10.5   7.22  )-----------( 233      ( 19 May 2024 07:25 )             32.6     05-19    139  |  108  |  11  ----------------------------<  86  4.0   |  28  |  0.66    Ca    8.3<L>      19 May 2024 07:25                General: NAD, resting comfortably in bed  Dressing C/D/I      Motor:                   C5                C6              C7               C8           T1   R             5/5                5/5            5/5              5/5          5/5  L             5/5                5/5            5/5              5/5          5/5                    L2                  L3*             L4              L5            S1  R            5/5                 4/5             5/5            5/5          5/5  L             5/5                4/5            5/5            5/5          5/5    *Limited due to pain    Sensory:            C5         C6         C7      C8       T1        (0=absent, 1=impaired, 2=normal, NT=not testable)  R         2            2           2        2         2  L          2            2           2        2         2               L2          L3         L4      L5       S1         (0=absent, 1=impaired, 2=normal, NT=not testable)  R         2            2            2        2        2  L          2            2           2        2         2        A/P:  19y f s/p L L4-S1 Lami (5/17/24) w/ Dr. Calles    - WBAT, LSO brace   -PT/OT   -Pain Control  -DVT ppx: SCDs.   -Continue perioperative abx x 24 hours  -FU AM Labs  -Incentive Spirometry  - Pulm recs appreciated.   -Dressing change this am.   - Dispo: Home; needs to clear PT

## 2024-05-20 NOTE — OCCUPATIONAL THERAPY INITIAL EVALUATION ADULT - PERTINENT HX OF CURRENT PROBLEM, REHAB EVAL
19 year old female s/p lumbar decompression, L4-5 L foraminotomy, L5-S1 L hemilaminectomy with discectomy, dural tear - sealed with duraseal on 5/17/24 due to lumbar stenosis.

## 2024-05-20 NOTE — SOCIAL WORK PROGRESS NOTE - NSSWPROGRESSNOTE_GEN_ALL_CORE
SW consult reviewed. Pt going home with assist. No SW needs noted at this time. SW to follow and remain available for any needs.

## 2024-05-20 NOTE — PROGRESS NOTE ADULT - SUBJECTIVE AND OBJECTIVE BOX
CHIEF COMPLAINT/ REASON FOR VISIT  .. Patient was seen to address the  issue listed under PROBLEM LIST which is located toward bottom of this note     YARELY ARDON    PLV [address]    Allergies    No Known Allergies    Intolerances        PAST MEDICAL & SURGICAL HISTORY:  No pertinent past medical history      No significant past surgical history          FAMILY HISTORY:  FH: cardiovascular disease (Grandparent)    FH: sickle cell anemia (Uncle)        Home Medications:      MEDICATIONS  (STANDING):  acetaminophen     Tablet .. 650 milliGRAM(s) Oral every 6 hours  budesonide 160 MICROgram(s)/formoterol 4.5 MICROgram(s) Inhaler 2 Puff(s) Inhalation two times a day  gabapentin 300 milliGRAM(s) Oral every 12 hours  HYDROmorphone  Injectable 0.5 milliGRAM(s) IV Push once  lactated ringers. 1000 milliLiter(s) (125 mL/Hr) IV Continuous <Continuous>  lactated ringers. 1000 milliLiter(s) (150 mL/Hr) IV Continuous <Continuous>  multivitamin 1 Tablet(s) Oral daily  pantoprazole    Tablet 40 milliGRAM(s) Oral before breakfast  polyethylene glycol 3350 17 Gram(s) Oral at bedtime  senna 2 Tablet(s) Oral at bedtime    MEDICATIONS  (PRN):  albuterol    0.083% 2.5 milliGRAM(s) Nebulizer every 4 hours PRN Shortness of Breath and/or Wheezing  aluminum hydroxide/magnesium hydroxide/simethicone Suspension 30 milliLiter(s) Oral four times a day PRN Indigestion  cyclobenzaprine 10 milliGRAM(s) Oral three times a day PRN Muscle Spasm  magnesium hydroxide Suspension 30 milliLiter(s) Oral daily PRN Constipation  ondansetron Injectable 4 milliGRAM(s) IV Push every 6 hours PRN Nausea and/or Vomiting  oxyCODONE    IR 10 milliGRAM(s) Oral every 4 hours PRN Severe Pain (7 - 10)  oxyCODONE    IR 5 milliGRAM(s) Oral every 4 hours PRN Moderate Pain (4 - 6)  traMADol 50 milliGRAM(s) Oral every 6 hours PRN Mild Pain (1 - 3)      Diet, Regular (05-17-24 @ 17:22) [Active]  Diet, Clear Liquid (05-17-24 @ 12:21) [Available for Activation]          Vital Signs Last 24 Hrs  T(C): 36.9 (20 May 2024 05:18), Max: 37.8 (19 May 2024 16:23)  T(F): 98.4 (20 May 2024 05:18), Max: 100.1 (19 May 2024 16:23)  HR: 93 (20 May 2024 05:18) (93 - 104)  BP: 110/73 (20 May 2024 05:18) (101/66 - 112/71)  BP(mean): --  RR: 18 (20 May 2024 05:18) (18 - 20)  SpO2: 97% (20 May 2024 05:18) (97% - 99%)    Parameters below as of 20 May 2024 05:18  Patient On (Oxygen Delivery Method): room air          05-19-24 @ 07:01  -  05-20-24 @ 07:00  --------------------------------------------------------  IN: 1950 mL / OUT: 800 mL / NET: 1150 mL              LABS:                        10.1   7.78  )-----------( 227      ( 20 May 2024 05:30 )             31.9     05-20    136  |  102  |  6<L>  ----------------------------<  77  4.3   |  31  |  0.61    Ca    8.5      20 May 2024 05:30        Urinalysis Basic - ( 20 May 2024 05:30 )    Color: x / Appearance: x / SG: x / pH: x  Gluc: 77 mg/dL / Ketone: x  / Bili: x / Urobili: x   Blood: x / Protein: x / Nitrite: x   Leuk Esterase: x / RBC: x / WBC x   Sq Epi: x / Non Sq Epi: x / Bacteria: x            WBC:  WBC Count: 7.78 K/uL (05-20 @ 05:30)  WBC Count: 7.22 K/uL (05-19 @ 07:25)  WBC Count: 8.93 K/uL (05-18 @ 07:00)  WBC Count: 5.96 K/uL (05-17 @ 15:20)      MICROBIOLOGY:  RECENT CULTURES:                  Sodium:  Sodium: 136 mmol/L (05-20 @ 05:30)  Sodium: 139 mmol/L (05-19 @ 07:25)  Sodium: 139 mmol/L (05-18 @ 07:00)  Sodium: 136 mmol/L (05-17 @ 15:20)      0.61 mg/dL 05-20 @ 05:30  0.66 mg/dL 05-19 @ 07:25  0.72 mg/dL 05-18 @ 07:00  0.79 mg/dL 05-17 @ 15:20      Hemoglobin:  Hemoglobin: 10.1 g/dL (05-20 @ 05:30)  Hemoglobin: 10.5 g/dL (05-19 @ 07:25)  Hemoglobin: 11.1 g/dL (05-18 @ 07:00)  Hemoglobin: 11.3 g/dL (05-17 @ 15:20)      Platelets: Platelet Count - Automated: 227 K/uL (05-20 @ 05:30)  Platelet Count - Automated: 233 K/uL (05-19 @ 07:25)  Platelet Count - Automated: 257 K/uL (05-18 @ 07:00)  Platelet Count - Automated: 232 K/uL (05-17 @ 15:20)          Urinalysis Basic - ( 20 May 2024 05:30 )    Color: x / Appearance: x / SG: x / pH: x  Gluc: 77 mg/dL / Ketone: x  / Bili: x / Urobili: x   Blood: x / Protein: x / Nitrite: x   Leuk Esterase: x / RBC: x / WBC x   Sq Epi: x / Non Sq Epi: x / Bacteria: x        RADIOLOGY & ADDITIONAL STUDIES:      MICROBIOLOGY:  RECENT CULTURES:

## 2024-05-20 NOTE — CASE MANAGEMENT PROGRESS NOTE - NSCMPROGRESSNOTE_GEN_ALL_CORE
Patient requires a RW for ambulation RX sent to [facility] for bedside delivery. Patient to attend outpt PT. Will remain available to patient and family.

## 2024-05-20 NOTE — DISCHARGE NOTE NURSING/CASE MANAGEMENT/SOCIAL WORK - PATIENT PORTAL LINK FT
You can access the FollowMyHealth Patient Portal offered by [facility] by registering at the following website: [email or web address]. By joining [email or web address]’s FollowMyHealth portal, you will also be able to view your health information using other applications (apps) compatible with our system.

## 2024-05-20 NOTE — CASE MANAGEMENT PROGRESS NOTE - NSCMPROGRESSNOTE_GEN_ALL_CORE
Rolling walker delivered to patients bedside. No home PT recommended. Will remain available to patient throughout hospital stay

## 2024-05-20 NOTE — PHYSICAL THERAPY INITIAL EVALUATION ADULT - NSPTDMEREC_GEN_A_CORE
The patient has a mobility limitation that significantly impairs his/her ability to participate in one or more mobility-related activities of daily living in the home.  The patient is able to safely use a rolling walking and the patient's functional mobility deficit can be sufficiently resolved by use of a walker./rolling walker

## 2024-05-20 NOTE — DISCHARGE NOTE PROVIDER - CARE PROVIDER_API CALL
Fannie Calles  Orthopaedic Surgery  [address] [facility], [address] [address]  Phone: [phone]  Fax: [phone]  Follow Up Time:

## 2024-05-20 NOTE — PHYSICAL THERAPY INITIAL EVALUATION ADULT - GENERAL OBSERVATIONS, REHAB EVAL
Patient found supine in bed with +IV lock, +external catheter, +bandage lumbar spine. PT/OT donned LSO when patient was seated on EOB.

## 2024-05-20 NOTE — DISCHARGE NOTE PROVIDER - NSDCMRMEDTOKEN_GEN_ALL_CORE_FT
Colace 100 mg oral capsule: 1 cap(s) orally 4 times a day as needed for -for constipation  cyclobenzaprine 10 mg oral tablet: 1 tab(s) orally 3 times a day as needed for -for muscle spasm MDD: 3  ondansetron 4 mg oral tablet: 1 tab(s) orally every 4 hours as needed for -for nausea  oxyCODONE 5 mg oral tablet: 1 tab(s) orally every 6 hours as needed for -for severe pain MDD: 4  traMADol 50 mg oral tablet: 1 tab(s) orally 3 times a day as needed for  severe pain MDD: 3

## 2024-05-20 NOTE — OCCUPATIONAL THERAPY INITIAL EVALUATION ADULT - ADDITIONAL COMMENTS
Pt lives in a house with 2 steps to enter with no handrail 12 steps with no handrail to basement to access bedroom. Pt reports that she can stay in her sister's room, 12 steps with handrail to access. +stall shower Pt reports that her sister and mother will be available to assist patient upon d/c home as needed. Pt performed sit to stand and ambulated 5' using RW with contact guard. Pt requires assistance with ADL's and transfers due to decreased strength, spine px, decreased endurance and impaired standing balance.

## 2024-05-20 NOTE — DISCHARGE NOTE PROVIDER - HOSPITAL COURSE
TBA
The patient is a 19 year old Female status post Left L4-S1 Decompression. The patient was seen outpatient and deemed medically optimized for the previously mentioned surgical procedure. The patient was taken to the operating room on date mentioned above. Prophylactic antibiotics were started before the procedure and continued for 24 hours or until drain removed. There were no complications during the procedure and the patient tolerated the procedure well. The patient was transfered to the recovery room in stable condition and subsequently to the surgical floor. The patient was given SCDs for DVT prophylaxis. All home medications were continued. The patient received physical therapy daily and daily labs were followed. The dressing was kept clean, dry, intact and changed on POD 3. The drain was removed when output appropriately decreased. The rest of the hospital stay was unremarkable. The patient was discharged in stable condition to follow up outpatient.

## 2024-05-20 NOTE — OCCUPATIONAL THERAPY INITIAL EVALUATION ADULT - ADL RETRAINING, OT EVAL
Patient will dress upper body with set-up and no assistance in 2-4 sessions. Patient will dress lower body with minimal assistance, AE as needed in 2-4 sessions.

## 2024-06-03 NOTE — OCCUPATIONAL THERAPY INITIAL EVALUATION ADULT - LEVEL OF INDEPENDENCE: DRESS LOWER BODY, OT EVAL
NURSING DAILY NOTE    Name: Latanya Ferrera   Date of Admission: 5/29/2024   Admitting Diagnosis: Hemorrhagic stroke (HCC)  Attending Physician: Huy Huang M.d.  Allergies: Patient has no known allergies.    Safety  Patient Assist     Patient Precautions  Fall Risk  Precaution Comments  R hemiplegia, R loss of sensation in hand, ICH  Bed Transfer Status  Minimal Assist  Toilet Transfer Status   Moderate Assist  Assistive Devices  Rails, Wheelchair  Oxygen  None - Room Air  Diet/Therapeutic Dining  Current Diet Order   Procedures    Diet Order Diet: Regular     Pill Administration  whole  Agitated Behavioral Scale     ABS Level of Severity       Fall Risk  Has the patient had a fall this admission?   No  Carly Fisher Fall Risk Scoring  16, HIGH RISK  Fall Risk Safety Measures  bed alarm, chair alarm, seatbelt alarm, and poor balance    Vitals  Temperature: 36.9 °C (98.4 °F)  Temp src: Oral  Pulse: 80  Respiration: 18  Blood Pressure: 130/70  Blood Pressure MAP (Calculated): 90 MM HG  BP Location: Upper Arm, Right  Patient BP Position: Supine     Oxygen  Pulse Oximetry: 97 %  O2 (LPM): 0  O2 Delivery Device: None - Room Air    Bowel and Bladder  Last Bowel Movement  06/01/24  Stool Type  Type 2: Sausage shaped, but lumpy  Bowel Device     Continent  Bladder: Did not void   Bowel: No movement  Bladder Function  Urine Void (mL): 750 ml  Urinary Options: Yes  Urine Color: Yellow  Genitourinary Assessment   Bladder Assessment (WDL):  WDL Except  Ramirez Catheter: Present with Active Order  Ramirez Reasons per MD Order: Acute urinary retention or bladder outlet obstruction  Ramirez Care: Given with Soap and Water  Urinary Elimination: Catheter (Document on LDA)  Urine Color: Yellow  Bladder Device: Indwelling Catheter  Bladder Medications: Yes    Skin  Xavier Score   17  Sensory Interventions   Bed Types: Standard/Trauma Mattress  Skin Preventative Measures: Waffle  Overlay  Moisture Interventions  Moisturizers/Barriers: Barrier Wipes  Containment Devices: Indwelling Catheter      Pain  Pain Rating Scale  4 - Distracts me, can do usual activities  Pain Location  Leg  Pain Location Orientation  Right  Pain Interventions   Medication (see MAR)    ADLs    Bathing   Family / Significant Other  Linen Change   Partial  Personal Hygiene     Chlorhexidine Bath      Oral Care  Brushed Teeth  Teeth/Dentures     Shave     Nutrition Percentage Eaten  Dinner, Between 50-75% Consumed  Environmental Precautions     Patient Turns/Positioning  Patient Turns Self from Side to Side  Patient Turns Assistance/Tolerance  Tolerates Well  Bed Positions     Head of Bed Elevated         Psychosocial/Neurologic Assessment  Psychosocial Assessment  Psychosocial (WDL):  WDL Except  Patient Behaviors: Crying, Fatigue  Family Behaviors: Family Present  Neurologic Assessment  Neuro (WDL): Exceptions to WDL  Level of Consciousness: Alert  Orientation Level: Oriented X4  Cognition: Follows commands, Appropriate attention/concentration  Speech: Clear  Motor Function/Sensation Assessment: Sensation, Motor strength  RUE Sensation: Numbness  Muscle Strength Right Arm: Fair Strength against Gravity but No Resistance  LUE Sensation: Full sensation  Muscle Strength Left Arm: Normal Strength Against Gravity and Full Resistance  RLE Sensation: Numbness (pt stated that her sensation to lower extremities is coming back to normal)  Muscle Strength Right Leg: Fair Strength against Gravity but No Resistance  LLE Sensation: Full sensation  Muscle Strength Left Leg: Normal Strength Against Gravity and Full Resistance  EENT (WDL):  Within Defined Limits    Cardio/Pulmonary Assessment  Edema   RLE Edema: Trace  LLE Edema: Trace  Respiratory Breath Sounds  RUL Breath Sounds: Clear  RML Breath Sounds: Clear  RLL Breath Sounds: Diminished  OZIEL Breath Sounds: Clear  LLL Breath Sounds: Diminished  Cardiac Assessment   Cardiac (WDL):   WDL Except         dependent (less than 25% patients effort)

## 2024-06-24 NOTE — DISCHARGE NOTE PROVIDER - NSDCHHCONTACT_GEN_ALL_CORE_FT
Follow-up with your primary care physician in regards to today's visit.     Take the steroids as prescribed.  It is important for you to have an EpiPen available to you at all time,     You may continue to take Benadryl up to 50mg every 4 hours as needed for hives and itching for the next 3-4 days. Do not drive or operate machinery/other dangerous equipment while taking Benadryl as it can cause sedation.     Return to the emergency department in regards to today's visit if any of the following symptoms emerge, including, but not limited to, if you develop tongue/lip swelling, face/facial swelling, throat swelling/tightness, chest tightness / difficulty breathing / shortness of breath, high pitched breathing, nausea vomiting and diarrhea, difficulty speaking/swallowing, drooling, a new rash, general worsening of today's symptoms, or if you have any concerns.     Carry an EpiPen with you at all times. If you start having symptoms like facial swelling, lip swelling, or other signs of allergic reaction, use the epipen as we discussed. If you use the EpiPen, go to the nearest emergency department immediately because it may wear off as we discussed and you may require repeated treatments and/or further care.     If you have an allergic reaction in the future that requires the use of an EpiPen please come to the ED for further evaluation as well, because the medication may wear off and you may need repeat treatments.    As certified below, I, or a nurse practitioner or physician assistant working with me, had a face-to-face encounter that meets the physician face-to-face encounter requirements.

## 2024-07-30 NOTE — DISCHARGE NOTE PROVIDER - NSDCCAREPROVSEEN_GEN_ALL_CORE_FT
[de-identified] : 21 year old male presents for annual exam.   overall is doing well, no acute issues.    diet- home cooking mostly.  has been cutting down portions.   exercise- 3-4 days per week   single, not sexually active  employed-   student- construction management  non-smoker  Fannie Calles

## 2024-09-16 NOTE — ED ADULT NURSE NOTE - NS SC CAGE ALCOHOL ANNOYED YOU
[facility]        NAME: Pretty Barraza is a 3 y.o. female  : 2021    MRN: [id number]  DATE: 2024  TIME: 5:21 PM    Assessment and Plan   Acute pharyngitis, unspecified etiology [J02.9]  1. Acute pharyngitis, unspecified etiology  POCT rapid strepA            Patient Instructions     Pharyngitis  Salt water gargles  Follow up with PCP in 3-5 days.  Proceed to  ER if symptoms worsen.    Chief Complaint     Chief Complaint   Patient presents with    Sore Throat     Patient with sore throat since last night. No fevers,          History of Present Illness       3-year-old female brought in by father complaining of sore throat, pain on swallowing x 2 days.  Father states that child was recently placed on antibiotics which she finished 1 week ago for a skin infection.  Denies fevers, chills, chest pain, shortness of breath.    Sore Throat  Associated symptoms include a sore throat.       Review of Systems   Review of Systems   HENT:  Positive for sore throat.          Current Medications       Current Outpatient Medications:     cephalexin (KEFLEX) 250 mg/5 mL suspension, Take 7 mL (350 mg total) by mouth 3 (three) times a day for 7 days, Disp: 147 mL, Rfl: 0    hydrocortisone 2.5 % ointment, Apply topically 2 (two) times a day for 7 days, Disp: 30 g, Rfl: 0    polyethylene glycol (GLYCOLAX) 17 GM/SCOOP powder, Take 8 g by mouth daily, Disp: 510 g, Rfl: 1    triamcinolone (KENALOG) 0.1 % ointment, Apply topically 2 (two) times a day Apply 2 times a day for up to 14 days.  DO NOT use on face or groin., Disp: 80 g, Rfl: 1    Current Allergies     Allergies as of 2024    (No Known Allergies)            The following portions of the patient's history were reviewed and updated as appropriate: allergies, current medications, past family history, past medical history, past social history, past surgical history and problem list.     Past Medical History:   Diagnosis Date     Gastroesophageal reflux disease without esophagitis 2019    Gross motor delay 2020    [place] screening tests negative 2019    Normal  (single liveborn) 2019       History reviewed. No pertinent surgical history.    Family History   Problem Relation Age of Onset    Seizures Maternal Grandmother         Copied from mother's family history at birth    Lymphoma Maternal Grandfather         Copied from mother's family history at birth    No Known Problems Brother         Copied from mother's family history at birth    Mental illness Neg Hx     Substance Abuse Neg Hx          Medications have been verified.        Objective   Pulse 107   Temp 98.1 °F (36.7 °C)   Resp 20   Wt 20.9 kg (46 lb)   SpO2 98%        Physical Exam     Physical Exam  Constitutional:       General: She is active. She is not in acute distress.     Appearance: She is well-developed. She is not diaphoretic.   HENT:      Head: Normocephalic and atraumatic.      Right Ear: Tympanic membrane and external ear normal. No drainage, swelling or tenderness. No middle ear effusion. Tympanic membrane is not erythematous.      Left Ear: Tympanic membrane and external ear normal. No drainage, swelling or tenderness.  No middle ear effusion. Tympanic membrane is not erythematous.      Mouth/Throat:      Mouth: Mucous membranes are moist.      Dentition: Normal.      Pharynx: Posterior oropharyngeal erythema and pharyngeal erythema present. No oropharyngeal exudate.   Eyes:      Extraocular Movements: EOM normal.      Conjunctiva/sclera: Conjunctivae normal.      Pupils: Pupils are equal, round, and reactive to light.   Cardiovascular:      Rate and Rhythm: Normal rate and regular rhythm.      Heart sounds: S1 normal and S2 normal.   Pulmonary:      Effort: Pulmonary effort is normal.      Breath sounds: Normal breath sounds and air entry.   Musculoskeletal:      Cervical back: Normal range of motion and neck supple. No rigidity.    Neurological:      Mental Status: She is alert.                    no

## 2024-10-16 ENCOUNTER — EMERGENCY (EMERGENCY)
Facility: HOSPITAL | Age: 19
LOS: 1 days | Discharge: HOME | End: 2024-10-16
Attending: EMERGENCY MEDICINE
Payer: COMMERCIAL

## 2024-10-16 VITALS
TEMPERATURE: 99 F | OXYGEN SATURATION: 98 % | RESPIRATION RATE: 16 BRPM | WEIGHT: 230.16 LBS | HEART RATE: 90 BPM | DIASTOLIC BLOOD PRESSURE: 88 MMHG | SYSTOLIC BLOOD PRESSURE: 122 MMHG

## 2024-10-16 VITALS
OXYGEN SATURATION: 99 % | RESPIRATION RATE: 18 BRPM | HEART RATE: 92 BPM | DIASTOLIC BLOOD PRESSURE: 88 MMHG | SYSTOLIC BLOOD PRESSURE: 135 MMHG | TEMPERATURE: 98 F

## 2024-10-16 LAB
AMPHET UR-MCNC: NEGATIVE — SIGNIFICANT CHANGE UP
APAP SERPL-MCNC: <3 UG/ML — LOW (ref 10–26)
APPEARANCE UR: CLEAR — SIGNIFICANT CHANGE UP
BARBITURATES UR SCN-MCNC: NEGATIVE — SIGNIFICANT CHANGE UP
BASOPHILS # BLD AUTO: 0.03 K/UL — SIGNIFICANT CHANGE UP (ref 0–0.2)
BASOPHILS NFR BLD AUTO: 0.6 % — SIGNIFICANT CHANGE UP (ref 0–2)
BENZODIAZ UR-MCNC: NEGATIVE — SIGNIFICANT CHANGE UP
BILIRUB UR-MCNC: NEGATIVE — SIGNIFICANT CHANGE UP
COCAINE METAB.OTHER UR-MCNC: NEGATIVE — SIGNIFICANT CHANGE UP
COLOR SPEC: YELLOW — SIGNIFICANT CHANGE UP
DIFF PNL FLD: NEGATIVE — SIGNIFICANT CHANGE UP
EOSINOPHIL # BLD AUTO: 0.48 K/UL — SIGNIFICANT CHANGE UP (ref 0–0.5)
EOSINOPHIL NFR BLD AUTO: 9 % — HIGH (ref 0–6)
ETHANOL SERPL-MCNC: <10 MG/DL — SIGNIFICANT CHANGE UP (ref 0–9)
FENTANYL UR QL SCN: NEGATIVE — SIGNIFICANT CHANGE UP
FLUAV AG NPH QL: SIGNIFICANT CHANGE UP
FLUBV AG NPH QL: SIGNIFICANT CHANGE UP
GLUCOSE UR QL: NEGATIVE MG/DL — SIGNIFICANT CHANGE UP
HCT VFR BLD CALC: 35.4 % — SIGNIFICANT CHANGE UP (ref 34.5–45)
HGB BLD-MCNC: 11.6 G/DL — SIGNIFICANT CHANGE UP (ref 11.5–15.5)
IMM GRANULOCYTES NFR BLD AUTO: 0.2 % — SIGNIFICANT CHANGE UP (ref 0–0.9)
KETONES UR-MCNC: NEGATIVE MG/DL — SIGNIFICANT CHANGE UP
LEUKOCYTE ESTERASE UR-ACNC: NEGATIVE — SIGNIFICANT CHANGE UP
LYMPHOCYTES # BLD AUTO: 2.18 K/UL — SIGNIFICANT CHANGE UP (ref 1–3.3)
LYMPHOCYTES # BLD AUTO: 41 % — SIGNIFICANT CHANGE UP (ref 13–44)
MCHC RBC-ENTMCNC: 26.5 PG — LOW (ref 27–34)
MCHC RBC-ENTMCNC: 32.8 GM/DL — SIGNIFICANT CHANGE UP (ref 32–36)
MCV RBC AUTO: 81 FL — SIGNIFICANT CHANGE UP (ref 80–100)
METHADONE UR-MCNC: NEGATIVE — SIGNIFICANT CHANGE UP
MONOCYTES # BLD AUTO: 0.35 K/UL — SIGNIFICANT CHANGE UP (ref 0–0.9)
MONOCYTES NFR BLD AUTO: 6.6 % — SIGNIFICANT CHANGE UP (ref 2–14)
NEUTROPHILS # BLD AUTO: 2.27 K/UL — SIGNIFICANT CHANGE UP (ref 1.8–7.4)
NEUTROPHILS NFR BLD AUTO: 42.6 % — LOW (ref 43–77)
NITRITE UR-MCNC: NEGATIVE — SIGNIFICANT CHANGE UP
OPIATES UR-MCNC: NEGATIVE — SIGNIFICANT CHANGE UP
PCP SPEC-MCNC: SIGNIFICANT CHANGE UP
PCP UR-MCNC: NEGATIVE — SIGNIFICANT CHANGE UP
PH UR: 7 — SIGNIFICANT CHANGE UP (ref 5–8)
PLATELET # BLD AUTO: 329 K/UL — SIGNIFICANT CHANGE UP (ref 150–400)
PROT UR-MCNC: NEGATIVE MG/DL — SIGNIFICANT CHANGE UP
RBC # BLD: 4.37 M/UL — SIGNIFICANT CHANGE UP (ref 3.8–5.2)
RBC # FLD: 14.6 % — HIGH (ref 10.3–14.5)
RSV RNA NPH QL NAA+NON-PROBE: SIGNIFICANT CHANGE UP
SALICYLATES SERPL-MCNC: <0.6 MG/DL — LOW (ref 10–20)
SARS-COV-2 RNA SPEC QL NAA+PROBE: SIGNIFICANT CHANGE UP
SP GR SPEC: 1.02 — SIGNIFICANT CHANGE UP (ref 1–1.03)
THC UR QL: POSITIVE
UROBILINOGEN FLD QL: 0.2 MG/DL — SIGNIFICANT CHANGE UP (ref 0.2–1)
WBC # BLD: 5.32 K/UL — SIGNIFICANT CHANGE UP (ref 3.8–10.5)
WBC # FLD AUTO: 5.32 K/UL — SIGNIFICANT CHANGE UP (ref 3.8–10.5)

## 2024-10-16 PROCEDURE — 70450 CT HEAD/BRAIN W/O DYE: CPT | Mod: 26,MC

## 2024-10-16 PROCEDURE — 87637 SARSCOV2&INF A&B&RSV AMP PRB: CPT

## 2024-10-16 PROCEDURE — 82962 GLUCOSE BLOOD TEST: CPT

## 2024-10-16 PROCEDURE — 80307 DRUG TEST PRSMV CHEM ANLYZR: CPT

## 2024-10-16 PROCEDURE — 93010 ELECTROCARDIOGRAM REPORT: CPT

## 2024-10-16 PROCEDURE — 36415 COLL VENOUS BLD VENIPUNCTURE: CPT

## 2024-10-16 PROCEDURE — 70450 CT HEAD/BRAIN W/O DYE: CPT | Mod: MC

## 2024-10-16 PROCEDURE — 96360 HYDRATION IV INFUSION INIT: CPT

## 2024-10-16 PROCEDURE — 84702 CHORIONIC GONADOTROPIN TEST: CPT

## 2024-10-16 PROCEDURE — 80053 COMPREHEN METABOLIC PANEL: CPT

## 2024-10-16 PROCEDURE — 81003 URINALYSIS AUTO W/O SCOPE: CPT

## 2024-10-16 PROCEDURE — 99285 EMERGENCY DEPT VISIT HI MDM: CPT | Mod: 25

## 2024-10-16 PROCEDURE — 87086 URINE CULTURE/COLONY COUNT: CPT

## 2024-10-16 PROCEDURE — 85025 COMPLETE CBC W/AUTO DIFF WBC: CPT

## 2024-10-16 PROCEDURE — 93005 ELECTROCARDIOGRAM TRACING: CPT

## 2024-10-16 PROCEDURE — 99285 EMERGENCY DEPT VISIT HI MDM: CPT

## 2024-10-16 RX ORDER — SODIUM CHLORIDE 0.9 % (FLUSH) 0.9 %
1000 SYRINGE (ML) INJECTION ONCE
Refills: 0 | Status: COMPLETED | OUTPATIENT
Start: 2024-10-16 | End: 2024-10-16

## 2024-10-16 RX ADMIN — Medication 1000 MILLILITER(S): at 14:37

## 2024-10-16 RX ADMIN — Medication 1000 MILLILITER(S): at 15:44

## 2024-10-16 NOTE — ED PROVIDER NOTE - PATIENT PORTAL LINK FT
You can access the FollowMyHealth Patient Portal offered by [facility] by registering at the following website: [email or web address]. By joining [employer]’s FollowMyHealth portal, you will also be able to view your health information using other applications (apps) compatible with our system.

## 2024-10-16 NOTE — ED ADULT TRIAGE NOTE - CHIEF COMPLAINT QUOTE
arrive to ED brought in by mother c/o episodes of unresponsiveness and aphasia starting 11am. tearful in triage. code stroke activated

## 2024-10-16 NOTE — ED PROVIDER NOTE - ATTENDING CONTRIBUTION TO CARE
19-year-old female history of lumbar surgery brought in by mom for an episode of unresponsiveness that started around 11:00 today.  Mom found her lying in her bed with her eyes open but was not speaking or moving.  Patient is currently speaking, moving all extremity, normal ambulation.  Drug or alcohol use, did not receive any news, no apparent trigger.  CT head unremarkable, Lab values do not require emergent intervention. Urinalysis demonstrates no acute pathology.  Urine culture pending. alcohol, asa, acetaminphomine level negative. Flu, COIVD, and RSV negative. +THC in drug screen.  symptoms appear to be conversion disorder.  Symptoms improved.  Outpatient resources given.  Supportive care.  Outpatient follow-up.

## 2024-10-16 NOTE — ED PROVIDER NOTE - NSFOLLOWUPINSTRUCTIONS_ED_ALL_ED_FT
You are advised to please follow up with your primary care doctor within the next 24 hours and return to the Emergency Department for worsening symptoms or any other concerns.  Your doctor may call [phone] to follow up on the specific results of the tests performed today in the emergency department.

## 2024-10-16 NOTE — ED PROVIDER NOTE - PHYSICAL EXAMINATION
General: Awake, alert, tearful  HEENT: Normocephalic, atraumatic. No scleral icterus or conjunctival injection. EOMI. Moist mucous membranes. Oropharynx clear. PERRL  Neck:. Soft and supple.  Cardiac: RRR,  Resp: Lungs CTAB. No accessory muscle use  Abd: Soft, non-tender, non-distended. No guarding, rebound, or rigidity.  Back: Spine midline and non-tender.   Skin: No rashes, abrasions, or lacerations.  Neuro: AO x 4. Moves all extremities symmetrically. Motor strength and sensation grossly intact. CN II-XII intact. 5/5 strength in all extremities. No dysmetria on finger to nose   Psych: flat affect. tearful. answers questions quietly

## 2024-10-16 NOTE — ED PROVIDER NOTE - OBJECTIVE STATEMENT
19y female w/ pmh of lumbar spinal surgery brought in by mother after an episode of unresponsiveness. Mother states that around 1100 today she found her daughter laying in her bed unresponsive. her eyes were open but she would not move or speak. patient was acting her normal self up until this point. mother states that the aunt was in the patients room conversing with her right before this episode. patient denies any pain, weakness, vision changes, difficulty walking. denies possibility of pregnancy. denies any current medications or drug use. denies hx of similar symptoms. 19y female w/ pmh of lumbar spinal surgery brought in by mother after an episode of unresponsiveness. Mother states that around 1100 today she found her daughter laying in her bed unresponsive. her eyes were open but she would not move or speak. patient was acting her normal self up until this point. mother states that the aunt was in the patients room conversing with her right before this episode. patient denies any pain, weakness, vision changes, difficulty walking. denies possibility of pregnancy. denies any current medications or drug use. denies hx of similar symptoms. patient does not recall this episode. states she was feeling her normal state of health in the morning, got her brothers ready for school, and then took a nap. denies any recent stressful events or situations.

## 2024-10-16 NOTE — ED PROVIDER NOTE - CLINICAL SUMMARY MEDICAL DECISION MAKING FREE TEXT BOX
H&P as stated. GCS 15 in arrival. NIH of 0. BEFAST negative. patient tearful and quiet to respond to questions. H&P as stated. GCS 15 on arrival. NIH of 0. BEFAST negative. patient tearful and quiet to respond to questions. workup unremarkable. Urine drug screen positive for marijuana. patient and mother are aware of marijuana use, state she did not use any today. patient back to baseline on reevaluation, no current complaints. psychiatry consulted, are not recommending urgent psychiatric assessment. outpatient psychiatric resources provided. patient to follow up with PCP. patient and mother agreeable with plan. stable for discharge.

## 2024-10-16 NOTE — ED ADULT NURSE NOTE - NSFALLUNIVINTERV_ED_ALL_ED
Bed/Stretcher in lowest position, wheels locked, appropriate side rails in place/Call bell, personal items and telephone in reach/Instruct patient to call for assistance before getting out of bed/chair/stretcher/Non-slip footwear applied when patient is off stretcher/[place] to call system/Physically safe environment - no spills, clutter or unnecessary equipment/Purposeful proactive rounding/Room/bathroom lighting operational, light cord in reach

## 2024-10-16 NOTE — ED ADULT NURSE NOTE - OBJECTIVE STATEMENT
Patient  A&O x 4, respiration even and unlabored, reports to ED  with mum at bedside complaining of difficulty speaking  and being unresponsive at home which started this morning.  Patient able to talk to staff at this time.  No apparent distress noted. Safety maintained

## 2024-10-17 LAB
CULTURE RESULTS: SIGNIFICANT CHANGE UP
SPECIMEN SOURCE: SIGNIFICANT CHANGE UP

## 2024-10-18 ENCOUNTER — EMERGENCY (EMERGENCY)
Facility: HOSPITAL | Age: 19
LOS: 1 days | Discharge: HOME | End: 2024-10-18
Attending: EMERGENCY MEDICINE
Payer: COMMERCIAL

## 2024-10-18 VITALS
HEART RATE: 91 BPM | SYSTOLIC BLOOD PRESSURE: 124 MMHG | DIASTOLIC BLOOD PRESSURE: 83 MMHG | WEIGHT: 227.08 LBS | RESPIRATION RATE: 18 BRPM | TEMPERATURE: 98 F | OXYGEN SATURATION: 96 %

## 2024-10-18 PROCEDURE — 99284 EMERGENCY DEPT VISIT MOD MDM: CPT

## 2024-10-18 PROCEDURE — 99283 EMERGENCY DEPT VISIT LOW MDM: CPT

## 2024-10-18 RX ORDER — OXYCODONE HYDROCHLORIDE 30 MG/1
1 TABLET, FILM COATED, EXTENDED RELEASE ORAL
Qty: 4 | Refills: 0
Start: 2024-10-18 | End: 2024-10-19

## 2024-10-18 RX ADMIN — Medication 1 TABLET(S): at 21:11

## 2024-10-18 NOTE — ED PROVIDER NOTE - PATIENT PORTAL LINK FT
You can access the FollowMyHealth Patient Portal offered by [facility] by registering at the following website: [email or web address]. By joining [employer]’s FollowMyHealth portal, you will also be able to view your health information using other applications (apps) compatible with our system.

## 2024-10-18 NOTE — ED PROVIDER NOTE - ATTENDING APP SHARED VISIT CONTRIBUTION OF CARE
19-year-old female presents with right tooth pain x 1 week.  On exam patient has a cracked upper molar, no surrounding erythema.  Pain not relieved with ibuprofen.  Patient has follow-up appointment with dentist.  Patient given Augmentin and Percocet for pain.  Outpatient follow-up.

## 2024-10-18 NOTE — ED PROVIDER NOTE - PHYSICAL EXAMINATION
13:30
General: non-toxic appearing, in no acute distress, A+Ox3  HENT: cracked 1st molar R upper jaw without surrounding gingival inflammation,  no visible or palpable abscess, no halitosis   CV: RRR, nl s1/s2.  Resp: CTAB, normal rate and effort

## 2024-10-18 NOTE — ED PROVIDER NOTE - OBJECTIVE STATEMENT
18yo F denies pmh presents to ED c/o R side tooth pain x1w. pt reports tooth has been cracked for several months, but pain has progressed over last few days. pt reports R side of face was swollen in the morning which improved after using ice pack. pt reports pain wakes her up out of her sleep. minimal relief with IBU 600mg. pt has upcoming apt with dentist in 2w. denies fever, CP, abdominal pain, NV

## 2024-10-18 NOTE — ED PROVIDER NOTE - CLINICAL SUMMARY MEDICAL DECISION MAKING FREE TEXT BOX
18yo F p/w tooth pain x1w. on eval, cracked 1st molar noted on R side upper jaw without surrounding gingival inflammation,  no visible or palpable abscess, no halitosis, remainder of PE WNL. VSS, afebrile. ordered oxycodone and augmentin, sent meds to pharmacy. encouraged to continue taking NSAIDS. pt has apt with dentist scheduled in 2w. discussed supportive care measures and return precautions. advised to also f/u with pcp. pt verbalized understanding and agreement

## 2025-03-21 NOTE — ED PROVIDER NOTE - NS ED MD DISPO DISCHARGE CCDA
Department of Anesthesiology  Postprocedure Note    Patient: Jada Sanchez  MRN: [id number]  YOB: 1999  Date of evaluation: 3/21/2025    Procedure Summary       Date: 03/21/25 Room / Location: [place] [address] / [facility]    Anesthesia Start: 1234 Anesthesia Stop: 1303    Procedures:       ESOPHAGOGASTRODUODENOSCOPY BIOPSY (Abdomen)      COLONOSCOPY BIOPSY Diagnosis:       Nausea      Gastroesophageal reflux disease, unspecified whether esophagitis present      Epigastric pain      Black stool      Diarrhea, unspecified type      Abdominal pain, unspecified abdominal location      BRBPR (bright red blood per rectum)      Mucus in stool      (Nausea [R11.0])      (Gastroesophageal reflux disease, unspecified whether esophagitis present [K21.9])      (Epigastric pain [R10.13])      (Black stool [K92.1])      (Diarrhea, unspecified type [R19.7])      (Abdominal pain, unspecified abdominal location [R10.9])      (BRBPR (bright red blood per rectum) [K62.5])      (Mucus in stool [R19.5])    Surgeons: Edmond Andrews MD Responsible Provider: Felisa Gooden APRN - CRNA    Anesthesia Type: general, TIVA ASA Status: 2            Anesthesia Type: No value filed.    Robles Phase I:      Robles Phase II:      Anesthesia Post Evaluation    Patient location during evaluation: bedside  Patient participation: complete - patient participated  Level of consciousness: sleepy but conscious  Pain score: 0  Airway patency: patent  Nausea & Vomiting: no nausea and no vomiting  Cardiovascular status: hemodynamically stable and blood pressure returned to baseline  Respiratory status: acceptable and room air  Hydration status: stable  Comments: /75   Pulse 74   Temp 97.3 °F (36.3 °C) (Temporal)   Resp 16   Ht 1.803 m (5' 11\")   Wt 120.2 kg (265 lb)   LMP 02/09/2025 (Exact Date)   SpO2 100%   BMI 36.96 kg/m²     Pain management: adequate    No notable events documented.   Patient/Caregiver provided printed discharge information.

## 2025-04-22 NOTE — ED ADULT TRIAGE NOTE - TEMPERATURE IN FAHRENHEIT (DEGREES F)
Patient requesting refill of losartan (cozzar) 50 mg daily as refill request was sent to previous provider.  Last refill at [facility] 10/16/24 90/1 by Antonina Sparks MD. Care everywhere encounter dated 4/22/25,  patient no longer seeing this provider. Refill request for losartan was denied by that provider.       99.4

## 2025-06-04 NOTE — ED PEDIATRIC TRIAGE NOTE - NS ED NURSE BANDS TYPE
Name band; [No Acute Distress] : no acute distress [Well Nourished] : well nourished [Well Developed] : well developed [Well-Appearing] : well-appearing [Normal Sclera/Conjunctiva] : normal sclera/conjunctiva [PERRL] : pupils equal round and reactive to light [EOMI] : extraocular movements intact [Normal Outer Ear/Nose] : the outer ears and nose were normal in appearance [Normal Oropharynx] : the oropharynx was normal [No JVD] : no jugular venous distention [No Lymphadenopathy] : no lymphadenopathy [Supple] : supple [No Respiratory Distress] : no respiratory distress  [No Accessory Muscle Use] : no accessory muscle use [Clear to Auscultation] : lungs were clear to auscultation bilaterally [Normal Rate] : normal rate  [Regular Rhythm] : with a regular rhythm [Normal S1, S2] : normal S1 and S2 [No Carotid Bruits] : no carotid bruits [No Abdominal Bruit] : a ~M bruit was not heard ~T in the abdomen [No Varicosities] : no varicosities [No Edema] : there was no peripheral edema [No Palpable Aorta] : no palpable aorta [No Extremity Clubbing/Cyanosis] : no extremity clubbing/cyanosis [Soft] : abdomen soft [Non Tender] : non-tender [Non-distended] : non-distended [No HSM] : no HSM [Normal Bowel Sounds] : normal bowel sounds [Normal Posterior Cervical Nodes] : no posterior cervical lymphadenopathy [Normal Anterior Cervical Nodes] : no anterior cervical lymphadenopathy [No CVA Tenderness] : no CVA  tenderness [No Spinal Tenderness] : no spinal tenderness [No Joint Swelling] : no joint swelling [Grossly Normal Strength/Tone] : grossly normal strength/tone [No Rash] : no rash [Coordination Grossly Intact] : coordination grossly intact [No Focal Deficits] : no focal deficits [Normal Gait] : normal gait [Normal Affect] : the affect was normal [Normal Insight/Judgement] : insight and judgment were intact

## (undated) DEVICE — DRAPE INSTRUMENT POUCH 7" X 12"

## (undated) DEVICE — DRAPE SURGICAL #1010

## (undated) DEVICE — PREP ALCOHOL PAD MED

## (undated) DEVICE — POSITIONER JACKSON TABLE PRONE CUSHION, TORSO COVER, HIP/THIGH PADS, ARM CRADLES

## (undated) DEVICE — DRAPE TOWEL BLUE 17" X 24"

## (undated) DEVICE — SUT VICRYL 0 18" CT-1 UNDYED (POP-OFF)

## (undated) DEVICE — GLV 8 DERMAPRENE ULTRA

## (undated) DEVICE — Device

## (undated) DEVICE — SUT VICRYL 2-0 18" CT-1 UNDYED (POP-OFF)

## (undated) DEVICE — BLADE SCALPEL SAFETYLOCK #15

## (undated) DEVICE — DRSG DERMABOND PRINEO 22CM

## (undated) DEVICE — POSITIONER FOAM LAMINECTOMY ARM CRADLE (PINK)

## (undated) DEVICE — MARKING PEN W RULER

## (undated) DEVICE — TUBING CODMAN INTEGRATED BIPOLAR CORD & TUBING SET FLYING LEADS

## (undated) DEVICE — PLV-SCD MACHINE: Type: DURABLE MEDICAL EQUIPMENT

## (undated) DEVICE — DRAPE 3/4 SHEET W REINFORCEMENT 56X77"

## (undated) DEVICE — POSITIONER JACKSON TABLE CHEST, HIP, THIGH PADS, ARM CRADLE

## (undated) DEVICE — DRAPE MAYO STAND 23"

## (undated) DEVICE — DRAPE IOBAN 13" X 13"

## (undated) DEVICE — DRSG STERISTRIPS 0.5 X 4"

## (undated) DEVICE — VENODYNE/SCD SLEEVE CALF MEDIUM

## (undated) DEVICE — MIDAS REX LEGEND LUBRICANT DIFFUSER CARTRIDGE

## (undated) DEVICE — POSITIONER CUSHION INSERT PRONE VIEW LG

## (undated) DEVICE — DRSG TEGADERM 2.5X3"

## (undated) DEVICE — DRSG AQUACEL 3.5 X 6"

## (undated) DEVICE — PACK MINOR WITH LAP

## (undated) DEVICE — SPECIMEN CONTAINER 4OZ

## (undated) DEVICE — ELCTR BOVIE TIP BLADE INSULATED 2.75" EDGE

## (undated) DEVICE — PREP DURAPREP 26CC

## (undated) DEVICE — GOWN XL W TOWEL

## (undated) DEVICE — DRSG TAPE MICROPORE 3"

## (undated) DEVICE — MIDAS REX LEGEND MATCH HEAD FLUTED SM BORE 3.0MM X 10CM

## (undated) DEVICE — WARMING BLANKET LOWER ADULT